# Patient Record
Sex: MALE | Employment: OTHER | URBAN - METROPOLITAN AREA
[De-identification: names, ages, dates, MRNs, and addresses within clinical notes are randomized per-mention and may not be internally consistent; named-entity substitution may affect disease eponyms.]

---

## 2018-02-02 PROBLEM — F32.A CHRONIC DEPRESSION: Status: ACTIVE | Noted: 2018-02-02

## 2018-02-02 PROBLEM — E11.9 TYPE 2 DIABETES MELLITUS WITHOUT COMPLICATION, WITHOUT LONG-TERM CURRENT USE OF INSULIN (HCC): Status: ACTIVE | Noted: 2018-02-02

## 2018-02-02 PROBLEM — I10 ESSENTIAL HYPERTENSION: Status: ACTIVE | Noted: 2018-02-02

## 2018-02-02 PROBLEM — N35.919 URETHRAL STRICTURE: Status: ACTIVE | Noted: 2018-02-02

## 2018-05-31 ENCOUNTER — HOSPITAL ENCOUNTER (OUTPATIENT)
Age: 84
Discharge: HOME | End: 2018-05-31
Payer: MEDICARE

## 2018-05-31 LAB
CHOLESTEROL, TOTAL: 108 MG/DL (ref 0–199)
HDLC SERPL-MCNC: 56 MG/DL
LDL CHOLESTEROL CALCULATED: 43 MG/DL (ref 0–99)
TRIGL SERPL-MCNC: 45 MG/DL (ref 0–149)
VLDLC SERPL CALC-MCNC: 9 MG/DL

## 2018-05-31 PROCEDURE — 36415 COLL VENOUS BLD VENIPUNCTURE: CPT

## 2018-05-31 PROCEDURE — 80061 LIPID PANEL: CPT

## 2018-06-01 PROBLEM — F33.42 RECURRENT MAJOR DEPRESSIVE DISORDER, IN FULL REMISSION (HCC): Status: ACTIVE | Noted: 2018-06-01

## 2018-11-30 PROBLEM — E11.21 TYPE 2 DIABETES MELLITUS WITH DIABETIC NEPHROPATHY, WITHOUT LONG-TERM CURRENT USE OF INSULIN (HCC): Status: ACTIVE | Noted: 2018-02-02

## 2018-11-30 PROBLEM — N18.30 STAGE 3 CHRONIC KIDNEY DISEASE (HCC): Status: ACTIVE | Noted: 2018-11-30

## 2019-07-17 PROBLEM — L20.84 INTRINSIC ECZEMA: Status: ACTIVE | Noted: 2019-07-17

## 2019-07-18 ENCOUNTER — HOSPITAL ENCOUNTER (OUTPATIENT)
Age: 85
Discharge: HOME | End: 2019-07-18
Payer: MEDICARE

## 2019-07-18 DIAGNOSIS — E11.9 TYPE 2 DIABETES MELLITUS WITHOUT COMPLICATION, WITHOUT LONG-TERM CURRENT USE OF INSULIN (HCC): ICD-10-CM

## 2019-07-18 LAB
ANION GAP SERPL CALCULATED.3IONS-SCNC: 14 MMOL/L (ref 7–16)
BUN BLDV-MCNC: 20 MG/DL (ref 8–23)
CALCIUM SERPL-MCNC: 9.2 MG/DL (ref 8.6–10.2)
CHLORIDE BLD-SCNC: 106 MMOL/L (ref 98–107)
CHOLESTEROL, TOTAL: 134 MG/DL (ref 0–199)
CO2: 21 MMOL/L (ref 22–29)
CREAT SERPL-MCNC: 1.5 MG/DL (ref 0.7–1.2)
CREATININE URINE: 98 MG/DL (ref 40–278)
GFR AFRICAN AMERICAN: 53
GFR NON-AFRICAN AMERICAN: 44 ML/MIN/1.73
GLUCOSE BLD-MCNC: 190 MG/DL (ref 74–99)
HBA1C MFR BLD: 6.8 % (ref 4–5.6)
HCT VFR BLD CALC: 33.3 % (ref 37–54)
HDLC SERPL-MCNC: 69 MG/DL
HEMOGLOBIN: 10.9 G/DL (ref 12.5–16.5)
LDL CHOLESTEROL CALCULATED: 53 MG/DL (ref 0–99)
MCH RBC QN AUTO: 31.4 PG (ref 26–35)
MCHC RBC AUTO-ENTMCNC: 32.7 % (ref 32–34.5)
MCV RBC AUTO: 96 FL (ref 80–99.9)
MICROALBUMIN UR-MCNC: <12 MG/L
MICROALBUMIN/CREAT UR-RTO: ABNORMAL (ref 0–30)
PDW BLD-RTO: 11.9 FL (ref 11.5–15)
PLATELET # BLD: 271 E9/L (ref 130–450)
PMV BLD AUTO: 9.1 FL (ref 7–12)
POTASSIUM SERPL-SCNC: 4.2 MMOL/L (ref 3.5–5)
RBC # BLD: 3.47 E12/L (ref 3.8–5.8)
SODIUM BLD-SCNC: 141 MMOL/L (ref 132–146)
TRIGL SERPL-MCNC: 61 MG/DL (ref 0–149)
VLDLC SERPL CALC-MCNC: 12 MG/DL
WBC # BLD: 9.4 E9/L (ref 4.5–11.5)

## 2019-07-18 PROCEDURE — 80061 LIPID PANEL: CPT

## 2019-07-18 PROCEDURE — 83036 HEMOGLOBIN GLYCOSYLATED A1C: CPT

## 2019-07-18 PROCEDURE — 82570 ASSAY OF URINE CREATININE: CPT

## 2019-07-18 PROCEDURE — 82044 UR ALBUMIN SEMIQUANTITATIVE: CPT

## 2019-07-18 PROCEDURE — 85027 COMPLETE CBC AUTOMATED: CPT

## 2019-07-18 PROCEDURE — 36415 COLL VENOUS BLD VENIPUNCTURE: CPT

## 2019-07-18 PROCEDURE — 80048 BASIC METABOLIC PNL TOTAL CA: CPT

## 2020-08-17 PROBLEM — F33.9 MAJOR DEPRESSION, RECURRENT, CHRONIC (HCC): Status: ACTIVE | Noted: 2020-08-17

## 2021-01-26 PROBLEM — E44.0 MODERATE PROTEIN-CALORIE MALNUTRITION (HCC): Status: ACTIVE | Noted: 2021-01-26

## 2021-05-25 ENCOUNTER — HOSPITAL ENCOUNTER (OUTPATIENT)
Age: 87
Discharge: HOME | End: 2021-05-25
Payer: MEDICARE

## 2021-05-25 DIAGNOSIS — E11.21 TYPE 2 DIABETES MELLITUS WITH DIABETIC NEPHROPATHY, WITHOUT LONG-TERM CURRENT USE OF INSULIN (HCC): ICD-10-CM

## 2021-05-25 LAB
ANION GAP SERPL CALCULATED.3IONS-SCNC: 9 MMOL/L (ref 7–16)
BUN BLDV-MCNC: 19 MG/DL (ref 6–23)
CALCIUM SERPL-MCNC: 8.4 MG/DL (ref 8.6–10.2)
CHLORIDE BLD-SCNC: 109 MMOL/L (ref 98–107)
CHOLESTEROL, TOTAL: 106 MG/DL (ref 0–199)
CO2: 21 MMOL/L (ref 22–29)
CREAT SERPL-MCNC: 1.7 MG/DL (ref 0.7–1.2)
CREATININE URINE: 143 MG/DL (ref 40–278)
GFR AFRICAN AMERICAN: 46
GFR NON-AFRICAN AMERICAN: 38 ML/MIN/1.73
GLUCOSE BLD-MCNC: 114 MG/DL (ref 74–99)
HBA1C MFR BLD: 5.5 % (ref 4–5.6)
HCT VFR BLD CALC: 33.9 % (ref 37–54)
HDLC SERPL-MCNC: 48 MG/DL
HEMOGLOBIN: 10.5 G/DL (ref 12.5–16.5)
LDL CHOLESTEROL CALCULATED: 47 MG/DL (ref 0–99)
MCH RBC QN AUTO: 30.5 PG (ref 26–35)
MCHC RBC AUTO-ENTMCNC: 31 % (ref 32–34.5)
MCV RBC AUTO: 98.5 FL (ref 80–99.9)
MICROALBUMIN UR-MCNC: 13.3 MG/L
MICROALBUMIN/CREAT UR-RTO: 9.3 (ref 0–30)
PDW BLD-RTO: 12.5 FL (ref 11.5–15)
PLATELET # BLD: 239 E9/L (ref 130–450)
PMV BLD AUTO: 9.5 FL (ref 7–12)
POTASSIUM SERPL-SCNC: 4.2 MMOL/L (ref 3.5–5)
RBC # BLD: 3.44 E12/L (ref 3.8–5.8)
SODIUM BLD-SCNC: 139 MMOL/L (ref 132–146)
TRIGL SERPL-MCNC: 54 MG/DL (ref 0–149)
VLDLC SERPL CALC-MCNC: 11 MG/DL
WBC # BLD: 5.1 E9/L (ref 4.5–11.5)

## 2021-05-25 PROCEDURE — 83036 HEMOGLOBIN GLYCOSYLATED A1C: CPT

## 2021-05-25 PROCEDURE — 85027 COMPLETE CBC AUTOMATED: CPT

## 2021-05-25 PROCEDURE — 80048 BASIC METABOLIC PNL TOTAL CA: CPT

## 2021-05-25 PROCEDURE — 82044 UR ALBUMIN SEMIQUANTITATIVE: CPT

## 2021-05-25 PROCEDURE — 36415 COLL VENOUS BLD VENIPUNCTURE: CPT

## 2021-05-25 PROCEDURE — 80061 LIPID PANEL: CPT

## 2021-05-25 PROCEDURE — 82570 ASSAY OF URINE CREATININE: CPT

## 2022-02-14 PROBLEM — F33.9 MAJOR DEPRESSION, RECURRENT, CHRONIC (HCC): Status: RESOLVED | Noted: 2020-08-17 | Resolved: 2022-02-14

## 2022-02-14 PROBLEM — F32.A DEPRESSION: Status: RESOLVED | Noted: 2018-02-02 | Resolved: 2022-02-14

## 2022-05-03 PROBLEM — E11.630: Status: ACTIVE | Noted: 2018-02-02

## 2023-01-31 ENCOUNTER — HOSPITAL ENCOUNTER (OUTPATIENT)
Age: 89
Discharge: HOME | End: 2023-01-31
Payer: COMMERCIAL

## 2023-01-31 DIAGNOSIS — E11.21 TYPE 2 DIABETES MELLITUS WITH DIABETIC NEPHROPATHY, WITHOUT LONG-TERM CURRENT USE OF INSULIN (HCC): ICD-10-CM

## 2023-01-31 LAB
ANION GAP SERPL CALCULATED.3IONS-SCNC: 15 MMOL/L (ref 7–16)
BUN BLDV-MCNC: 23 MG/DL (ref 6–23)
CALCIUM SERPL-MCNC: 9.2 MG/DL (ref 8.6–10.2)
CHLORIDE BLD-SCNC: 105 MMOL/L (ref 98–107)
CHOLESTEROL, TOTAL: 182 MG/DL (ref 0–199)
CO2: 23 MMOL/L (ref 22–29)
CREAT SERPL-MCNC: 1.6 MG/DL (ref 0.7–1.2)
CREATININE URINE: 71 MG/DL (ref 40–278)
GFR SERPL CREATININE-BSD FRML MDRD: 40 ML/MIN/1.73
GLUCOSE BLD-MCNC: 137 MG/DL (ref 74–99)
HBA1C MFR BLD: 5.5 % (ref 4–5.6)
HCT VFR BLD CALC: 37.5 % (ref 37–54)
HDLC SERPL-MCNC: 80 MG/DL
HEMOGLOBIN: 12.2 G/DL (ref 12.5–16.5)
LDL CHOLESTEROL CALCULATED: 91 MG/DL (ref 0–99)
MCH RBC QN AUTO: 31.6 PG (ref 26–35)
MCHC RBC AUTO-ENTMCNC: 32.5 % (ref 32–34.5)
MCV RBC AUTO: 97.2 FL (ref 80–99.9)
MICROALBUMIN UR-MCNC: 61.6 MG/L
MICROALBUMIN/CREAT UR-RTO: 86.8 (ref 0–30)
PDW BLD-RTO: 12.1 FL (ref 11.5–15)
PLATELET # BLD: 312 E9/L (ref 130–450)
PMV BLD AUTO: 9.9 FL (ref 7–12)
POTASSIUM SERPL-SCNC: 4.5 MMOL/L (ref 3.5–5)
RBC # BLD: 3.86 E12/L (ref 3.8–5.8)
SODIUM BLD-SCNC: 143 MMOL/L (ref 132–146)
TRIGL SERPL-MCNC: 55 MG/DL (ref 0–149)
VLDLC SERPL CALC-MCNC: 11 MG/DL
WBC # BLD: 6.2 E9/L (ref 4.5–11.5)

## 2023-01-31 PROCEDURE — 85027 COMPLETE CBC AUTOMATED: CPT

## 2023-01-31 PROCEDURE — 82570 ASSAY OF URINE CREATININE: CPT

## 2023-01-31 PROCEDURE — 82044 UR ALBUMIN SEMIQUANTITATIVE: CPT

## 2023-01-31 PROCEDURE — 80048 BASIC METABOLIC PNL TOTAL CA: CPT

## 2023-01-31 PROCEDURE — 80061 LIPID PANEL: CPT

## 2023-01-31 PROCEDURE — 36415 COLL VENOUS BLD VENIPUNCTURE: CPT

## 2023-01-31 PROCEDURE — 83036 HEMOGLOBIN GLYCOSYLATED A1C: CPT

## 2023-07-25 PROBLEM — E11.22 TYPE 2 DIABETES MELLITUS WITH CHRONIC KIDNEY DISEASE (HCC): Status: ACTIVE | Noted: 2023-07-25

## 2023-08-02 ENCOUNTER — HOSPITAL ENCOUNTER (OUTPATIENT)
Age: 89
Discharge: HOME | End: 2023-08-02
Payer: COMMERCIAL

## 2023-08-02 DIAGNOSIS — E11.9 DIABETES MELLITUS WITHOUT COMPLICATION (HCC): ICD-10-CM

## 2023-08-02 LAB
ANION GAP SERPL CALCULATED.3IONS-SCNC: 15 MMOL/L (ref 7–16)
BUN SERPL-MCNC: 13 MG/DL (ref 6–23)
CALCIUM SERPL-MCNC: 8.6 MG/DL (ref 8.6–10.2)
CHLORIDE SERPL-SCNC: 105 MMOL/L (ref 98–107)
CHOLEST SERPL-MCNC: 156 MG/DL
CO2 SERPL-SCNC: 23 MMOL/L (ref 22–29)
CREAT SERPL-MCNC: 1.7 MG/DL (ref 0.7–1.2)
ERYTHROCYTE [DISTWIDTH] IN BLOOD BY AUTOMATED COUNT: 11.8 % (ref 11.5–15)
GFR SERPL CREATININE-BSD FRML MDRD: 39 ML/MIN/1.73M2
GLUCOSE SERPL-MCNC: 105 MG/DL (ref 74–99)
HBA1C MFR BLD: 5.3 % (ref 4–5.6)
HCT VFR BLD AUTO: 34.7 % (ref 37–54)
HDLC SERPL-MCNC: 58 MG/DL
HGB BLD-MCNC: 11.3 G/DL (ref 12.5–16.5)
LDLC SERPL CALC-MCNC: 89 MG/DL
MCH RBC QN AUTO: 31.9 PG (ref 26–35)
MCHC RBC AUTO-ENTMCNC: 32.6 G/DL (ref 32–34.5)
MCV RBC AUTO: 98 FL (ref 80–99.9)
PLATELET # BLD AUTO: 236 K/UL (ref 130–450)
PMV BLD AUTO: 9.4 FL (ref 7–12)
POTASSIUM SERPL-SCNC: 4.7 MMOL/L (ref 3.5–5)
RBC # BLD AUTO: 3.54 M/UL (ref 3.8–5.8)
SODIUM SERPL-SCNC: 143 MMOL/L (ref 132–146)
TRIGL SERPL-MCNC: 47 MG/DL
VLDLC SERPL CALC-MCNC: 9 MG/DL
WBC OTHER # BLD: 5.3 K/UL (ref 4.5–11.5)

## 2023-08-02 PROCEDURE — 83036 HEMOGLOBIN GLYCOSYLATED A1C: CPT

## 2023-08-02 PROCEDURE — 36415 COLL VENOUS BLD VENIPUNCTURE: CPT

## 2023-08-02 PROCEDURE — 85027 COMPLETE CBC AUTOMATED: CPT

## 2023-08-02 PROCEDURE — 80048 BASIC METABOLIC PNL TOTAL CA: CPT

## 2023-08-02 PROCEDURE — 80061 LIPID PANEL: CPT

## 2024-02-28 PROBLEM — I10 ESSENTIAL HYPERTENSION: Status: RESOLVED | Noted: 2018-02-02 | Resolved: 2024-02-28

## 2024-02-28 PROBLEM — N18.30 STAGE 3 CHRONIC KIDNEY DISEASE (HCC): Status: RESOLVED | Noted: 2018-11-30 | Resolved: 2024-02-28

## 2024-02-28 PROBLEM — G30.1 ALZHEIMER'S DISEASE WITH LATE ONSET (HCC): Status: ACTIVE | Noted: 2024-02-28

## 2024-02-28 PROBLEM — E11.630: Status: RESOLVED | Noted: 2018-02-02 | Resolved: 2024-02-28

## 2024-02-28 PROBLEM — F02.80 ALZHEIMER'S DISEASE WITH LATE ONSET (HCC): Status: ACTIVE | Noted: 2024-02-28

## 2024-02-28 PROBLEM — E11.22 TYPE 2 DIABETES MELLITUS WITH CHRONIC KIDNEY DISEASE (HCC): Status: RESOLVED | Noted: 2023-07-25 | Resolved: 2024-02-28

## 2025-02-28 ENCOUNTER — APPOINTMENT (OUTPATIENT)
Dept: CT IMAGING | Age: 89
DRG: 690 | End: 2025-02-28
Payer: MEDICARE

## 2025-02-28 ENCOUNTER — HOSPITAL ENCOUNTER (INPATIENT)
Age: 89
LOS: 3 days | Discharge: HOME HEALTH | DRG: 690 | End: 2025-03-04
Attending: EMERGENCY MEDICINE | Admitting: INTERNAL MEDICINE
Payer: MEDICARE

## 2025-02-28 ENCOUNTER — APPOINTMENT (OUTPATIENT)
Dept: GENERAL RADIOLOGY | Age: 89
DRG: 690 | End: 2025-02-28
Payer: MEDICARE
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
## 2025-02-28 DIAGNOSIS — S09.90XA CLOSED HEAD INJURY, INITIAL ENCOUNTER: ICD-10-CM

## 2025-02-28 DIAGNOSIS — R31.9 URINARY TRACT INFECTION WITH HEMATURIA, SITE UNSPECIFIED: Primary | ICD-10-CM

## 2025-02-28 DIAGNOSIS — N39.0 URINARY TRACT INFECTION WITH HEMATURIA, SITE UNSPECIFIED: Primary | ICD-10-CM

## 2025-02-28 LAB
ALBUMIN SERPL-MCNC: 3.6 G/DL (ref 3.5–5.2)
ALP SERPL-CCNC: 148 U/L (ref 40–129)
ALT SERPL-CCNC: 18 U/L (ref 0–40)
ANION GAP SERPL CALCULATED.3IONS-SCNC: 14 MMOL/L (ref 7–16)
AST SERPL-CCNC: 36 U/L (ref 0–39)
BASOPHILS # BLD: 0.02 K/UL (ref 0–0.2)
BASOPHILS NFR BLD: 0 % (ref 0–2)
BILIRUB SERPL-MCNC: 0.3 MG/DL (ref 0–1.2)
BUN SERPL-MCNC: 30 MG/DL (ref 6–23)
CALCIUM SERPL-MCNC: 8.2 MG/DL (ref 8.6–10.2)
CHLORIDE SERPL-SCNC: 104 MMOL/L (ref 98–107)
CO2 SERPL-SCNC: 19 MMOL/L (ref 22–29)
CREAT SERPL-MCNC: 1.4 MG/DL (ref 0.7–1.2)
EOSINOPHIL # BLD: 0.01 K/UL (ref 0.05–0.5)
EOSINOPHILS RELATIVE PERCENT: 0 % (ref 0–6)
ERYTHROCYTE [DISTWIDTH] IN BLOOD BY AUTOMATED COUNT: 11.9 % (ref 11.5–15)
FLUAV RNA RESP QL NAA+PROBE: NOT DETECTED
FLUBV RNA RESP QL NAA+PROBE: NOT DETECTED
GFR, ESTIMATED: 45 ML/MIN/1.73M2
GLUCOSE SERPL-MCNC: 118 MG/DL (ref 74–99)
HCT VFR BLD AUTO: 32.4 % (ref 37–54)
HGB BLD-MCNC: 11 G/DL (ref 12.5–16.5)
IMM GRANULOCYTES # BLD AUTO: 0.03 K/UL (ref 0–0.58)
IMM GRANULOCYTES NFR BLD: 0 % (ref 0–5)
LYMPHOCYTES NFR BLD: 0.64 K/UL (ref 1.5–4)
LYMPHOCYTES RELATIVE PERCENT: 7 % (ref 20–42)
MCH RBC QN AUTO: 31.8 PG (ref 26–35)
MCHC RBC AUTO-ENTMCNC: 34 G/DL (ref 32–34.5)
MCV RBC AUTO: 93.6 FL (ref 80–99.9)
MONOCYTES NFR BLD: 0.5 K/UL (ref 0.1–0.95)
MONOCYTES NFR BLD: 5 % (ref 2–12)
NEUTROPHILS NFR BLD: 88 % (ref 43–80)
NEUTS SEG NFR BLD: 8.37 K/UL (ref 1.8–7.3)
PLATELET # BLD AUTO: 369 K/UL (ref 130–450)
PMV BLD AUTO: 8.8 FL (ref 7–12)
POTASSIUM SERPL-SCNC: 4.9 MMOL/L (ref 3.5–5)
PROT SERPL-MCNC: 7.3 G/DL (ref 6.4–8.3)
RBC # BLD AUTO: 3.46 M/UL (ref 3.8–5.8)
SARS-COV-2 RNA RESP QL NAA+PROBE: NOT DETECTED
SODIUM SERPL-SCNC: 137 MMOL/L (ref 132–146)
SOURCE: NORMAL
SPECIMEN DESCRIPTION: NORMAL
TROPONIN I SERPL HS-MCNC: 28 NG/L (ref 0–11)
WBC OTHER # BLD: 9.6 K/UL (ref 4.5–11.5)

## 2025-02-28 PROCEDURE — 93005 ELECTROCARDIOGRAM TRACING: CPT

## 2025-02-28 PROCEDURE — 84484 ASSAY OF TROPONIN QUANT: CPT

## 2025-02-28 PROCEDURE — 99285 EMERGENCY DEPT VISIT HI MDM: CPT

## 2025-02-28 PROCEDURE — 71045 X-RAY EXAM CHEST 1 VIEW: CPT

## 2025-02-28 PROCEDURE — 72125 CT NECK SPINE W/O DYE: CPT

## 2025-02-28 PROCEDURE — 85025 COMPLETE CBC W/AUTO DIFF WBC: CPT

## 2025-02-28 PROCEDURE — 70450 CT HEAD/BRAIN W/O DYE: CPT

## 2025-02-28 PROCEDURE — 80053 COMPREHEN METABOLIC PANEL: CPT

## 2025-02-28 PROCEDURE — 87636 SARSCOV2 & INF A&B AMP PRB: CPT

## 2025-02-28 ASSESSMENT — LIFESTYLE VARIABLES
HOW OFTEN DO YOU HAVE A DRINK CONTAINING ALCOHOL: NEVER
HOW MANY STANDARD DRINKS CONTAINING ALCOHOL DO YOU HAVE ON A TYPICAL DAY: PATIENT DOES NOT DRINK

## 2025-03-01 PROBLEM — N18.32 STAGE 3B CHRONIC KIDNEY DISEASE (HCC): Status: ACTIVE | Noted: 2018-11-30

## 2025-03-01 PROBLEM — N17.9 AKI (ACUTE KIDNEY INJURY): Status: ACTIVE | Noted: 2025-03-01

## 2025-03-01 PROBLEM — N39.0 UTI (URINARY TRACT INFECTION): Status: ACTIVE | Noted: 2025-03-01
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
## 2025-03-01 LAB
AMORPH SED URNS QL MICRO: PRESENT
ANION GAP SERPL CALCULATED.3IONS-SCNC: 16 MMOL/L (ref 7–16)
BACTERIA URNS QL MICRO: ABNORMAL
BASOPHILS # BLD: 0.02 K/UL (ref 0–0.2)
BASOPHILS NFR BLD: 0 % (ref 0–2)
BILIRUB UR QL STRIP: NEGATIVE
BUN SERPL-MCNC: 31 MG/DL (ref 6–23)
CALCIUM SERPL-MCNC: 8.5 MG/DL (ref 8.6–10.2)
CHLORIDE SERPL-SCNC: 106 MMOL/L (ref 98–107)
CLARITY UR: ABNORMAL
CO2 SERPL-SCNC: 18 MMOL/L (ref 22–29)
COLOR UR: YELLOW
CREAT SERPL-MCNC: 1.7 MG/DL (ref 0.7–1.2)
EOSINOPHIL # BLD: 0.02 K/UL (ref 0.05–0.5)
EOSINOPHILS RELATIVE PERCENT: 0 % (ref 0–6)
ERYTHROCYTE [DISTWIDTH] IN BLOOD BY AUTOMATED COUNT: 11.9 % (ref 11.5–15)
GFR, ESTIMATED: 38 ML/MIN/1.73M2
GLUCOSE BLD-MCNC: 129 MG/DL (ref 74–99)
GLUCOSE BLD-MCNC: 138 MG/DL (ref 74–99)
GLUCOSE SERPL-MCNC: 112 MG/DL (ref 74–99)
GLUCOSE UR STRIP-MCNC: NEGATIVE MG/DL
HBA1C MFR BLD: 5.2 % (ref 4–5.6)
HCT VFR BLD AUTO: 29.9 % (ref 37–54)
HGB BLD-MCNC: 10.3 G/DL (ref 12.5–16.5)
HGB UR QL STRIP.AUTO: ABNORMAL
IMM GRANULOCYTES # BLD AUTO: 0.03 K/UL (ref 0–0.58)
IMM GRANULOCYTES NFR BLD: 0 % (ref 0–5)
KETONES UR STRIP-MCNC: NEGATIVE MG/DL
LEUKOCYTE ESTERASE UR QL STRIP: ABNORMAL
LYMPHOCYTES NFR BLD: 1.12 K/UL (ref 1.5–4)
LYMPHOCYTES RELATIVE PERCENT: 13 % (ref 20–42)
MCH RBC QN AUTO: 31.8 PG (ref 26–35)
MCHC RBC AUTO-ENTMCNC: 34.4 G/DL (ref 32–34.5)
MCV RBC AUTO: 92.3 FL (ref 80–99.9)
MONOCYTES NFR BLD: 0.58 K/UL (ref 0.1–0.95)
MONOCYTES NFR BLD: 7 % (ref 2–12)
NEUTROPHILS NFR BLD: 80 % (ref 43–80)
NEUTS SEG NFR BLD: 6.84 K/UL (ref 1.8–7.3)
NITRITE UR QL STRIP: NEGATIVE
PH UR STRIP: 7.5 [PH] (ref 5–8)
PLATELET # BLD AUTO: 374 K/UL (ref 130–450)
PMV BLD AUTO: 8.7 FL (ref 7–12)
POTASSIUM SERPL-SCNC: 5 MMOL/L (ref 3.5–5)
PROT UR STRIP-MCNC: 30 MG/DL
RBC # BLD AUTO: 3.24 M/UL (ref 3.8–5.8)
RBC #/AREA URNS HPF: ABNORMAL /HPF
SODIUM SERPL-SCNC: 140 MMOL/L (ref 132–146)
SP GR UR STRIP: 1.01 (ref 1–1.03)
TROPONIN I SERPL HS-MCNC: 29 NG/L (ref 0–11)
TROPONIN I SERPL HS-MCNC: 34 NG/L (ref 0–11)
UROBILINOGEN UR STRIP-ACNC: 0.2 EU/DL (ref 0–1)
WBC #/AREA URNS HPF: ABNORMAL /HPF
WBC OTHER # BLD: 8.6 K/UL (ref 4.5–11.5)

## 2025-03-01 PROCEDURE — 82962 GLUCOSE BLOOD TEST: CPT

## 2025-03-01 PROCEDURE — 6370000000 HC RX 637 (ALT 250 FOR IP): Performed by: NURSE PRACTITIONER

## 2025-03-01 PROCEDURE — 83036 HEMOGLOBIN GLYCOSYLATED A1C: CPT

## 2025-03-01 PROCEDURE — 80048 BASIC METABOLIC PNL TOTAL CA: CPT

## 2025-03-01 PROCEDURE — 87040 BLOOD CULTURE FOR BACTERIA: CPT

## 2025-03-01 PROCEDURE — 87086 URINE CULTURE/COLONY COUNT: CPT

## 2025-03-01 PROCEDURE — 96374 THER/PROPH/DIAG INJ IV PUSH: CPT

## 2025-03-01 PROCEDURE — 6360000002 HC RX W HCPCS

## 2025-03-01 PROCEDURE — 6360000002 HC RX W HCPCS: Performed by: NURSE PRACTITIONER

## 2025-03-01 PROCEDURE — 84484 ASSAY OF TROPONIN QUANT: CPT

## 2025-03-01 PROCEDURE — 2060000000 HC ICU INTERMEDIATE R&B

## 2025-03-01 PROCEDURE — 81001 URINALYSIS AUTO W/SCOPE: CPT

## 2025-03-01 PROCEDURE — 2500000003 HC RX 250 WO HCPCS: Performed by: NURSE PRACTITIONER

## 2025-03-01 PROCEDURE — 85025 COMPLETE CBC W/AUTO DIFF WBC: CPT

## 2025-03-01 PROCEDURE — 2500000003 HC RX 250 WO HCPCS

## 2025-03-01 RX ORDER — POLYETHYLENE GLYCOL 3350 17 G/17G
17 POWDER, FOR SOLUTION ORAL DAILY PRN
Status: DISCONTINUED | OUTPATIENT
Start: 2025-03-01 | End: 2025-03-04 | Stop reason: HOSPADM

## 2025-03-01 RX ORDER — SODIUM CHLORIDE 9 MG/ML
INJECTION, SOLUTION INTRAVENOUS PRN
Status: DISCONTINUED | OUTPATIENT
Start: 2025-03-01 | End: 2025-03-04 | Stop reason: HOSPADM

## 2025-03-01 RX ORDER — SODIUM CHLORIDE 9 MG/ML
INJECTION, SOLUTION INTRAVENOUS CONTINUOUS
Status: DISCONTINUED | OUTPATIENT
Start: 2025-03-01 | End: 2025-03-01

## 2025-03-01 RX ORDER — ACETAMINOPHEN 325 MG/1
650 TABLET ORAL EVERY 6 HOURS PRN
Status: DISCONTINUED | OUTPATIENT
Start: 2025-03-01 | End: 2025-03-04 | Stop reason: HOSPADM

## 2025-03-01 RX ORDER — PROCHLORPERAZINE EDISYLATE 5 MG/ML
5 INJECTION INTRAMUSCULAR; INTRAVENOUS EVERY 6 HOURS PRN
Status: DISCONTINUED | OUTPATIENT
Start: 2025-03-01 | End: 2025-03-04 | Stop reason: HOSPADM

## 2025-03-01 RX ORDER — SODIUM CHLORIDE 0.9 % (FLUSH) 0.9 %
5-40 SYRINGE (ML) INJECTION PRN
Status: DISCONTINUED | OUTPATIENT
Start: 2025-03-01 | End: 2025-03-04 | Stop reason: HOSPADM

## 2025-03-01 RX ORDER — HEPARIN SODIUM 5000 [USP'U]/ML
5000 INJECTION, SOLUTION INTRAVENOUS; SUBCUTANEOUS EVERY 8 HOURS
Status: DISCONTINUED | OUTPATIENT
Start: 2025-03-01 | End: 2025-03-04 | Stop reason: HOSPADM

## 2025-03-01 RX ORDER — GLUCAGON 1 MG/ML
1 KIT INJECTION PRN
Status: DISCONTINUED | OUTPATIENT
Start: 2025-03-01 | End: 2025-03-04 | Stop reason: HOSPADM

## 2025-03-01 RX ORDER — DEXTROSE MONOHYDRATE 100 MG/ML
INJECTION, SOLUTION INTRAVENOUS CONTINUOUS PRN
Status: DISCONTINUED | OUTPATIENT
Start: 2025-03-01 | End: 2025-03-04 | Stop reason: HOSPADM

## 2025-03-01 RX ORDER — INSULIN LISPRO 100 [IU]/ML
0-4 INJECTION, SOLUTION INTRAVENOUS; SUBCUTANEOUS
Status: DISCONTINUED | OUTPATIENT
Start: 2025-03-01 | End: 2025-03-04 | Stop reason: HOSPADM

## 2025-03-01 RX ORDER — ACETAMINOPHEN 650 MG/1
650 SUPPOSITORY RECTAL EVERY 6 HOURS PRN
Status: DISCONTINUED | OUTPATIENT
Start: 2025-03-01 | End: 2025-03-04 | Stop reason: HOSPADM

## 2025-03-01 RX ORDER — SODIUM CHLORIDE 0.9 % (FLUSH) 0.9 %
5-40 SYRINGE (ML) INJECTION EVERY 12 HOURS SCHEDULED
Status: DISCONTINUED | OUTPATIENT
Start: 2025-03-01 | End: 2025-03-04 | Stop reason: HOSPADM

## 2025-03-01 RX ADMIN — HEPARIN SODIUM 5000 UNITS: 5000 INJECTION INTRAVENOUS; SUBCUTANEOUS at 13:54

## 2025-03-01 RX ADMIN — WATER 2000 MG: 1 INJECTION INTRAMUSCULAR; INTRAVENOUS; SUBCUTANEOUS at 03:48

## 2025-03-01 RX ADMIN — HEPARIN SODIUM 5000 UNITS: 5000 INJECTION INTRAVENOUS; SUBCUTANEOUS at 21:28

## 2025-03-01 RX ADMIN — ACETAMINOPHEN 650 MG: 325 TABLET ORAL at 23:05

## 2025-03-01 RX ADMIN — SODIUM CHLORIDE, PRESERVATIVE FREE 10 ML: 5 INJECTION INTRAVENOUS at 21:28

## 2025-03-01 NOTE — ED NOTES
Received report from SHELIA Aguilera; assumed care of pt at this time; resting in bed; awaiting lab results; needs met; will monitor

## 2025-03-01 NOTE — PROGRESS NOTES
Admission Database complete. Patient has been decontaminated due to bed bugs found this morning, bathed, all belongings, sprayed and double bagged. Patient instructed not to open belongings until he is at home.   Patient's son instructed he is unable to visit due to him living in same residents but a phone number was provided for him to call for an update.     Deanna Godinez RN, BSN

## 2025-03-01 NOTE — ED PROVIDER NOTES
[facility]  EMERGENCY DEPARTMENT ENCOUNTER        Pt Name: Paul Pemberton  MRN: [id number]  Birthdate 9/5/1929  Date of evaluation: 2/28/2025  Provider: Allen Worley MD  PCP: Guillaume Krishna DO  Note Started: 9:00 PM EST 2/28/25    CHIEF COMPLAINT       Chief Complaint   Patient presents with    Fall     Pt rolled and fell out of bed landing on his buttocks. Pt denies hitting head and denieaany injuries. Son stated pt has been getting more confused for the past 2-3months.        HISTORY OF PRESENT ILLNESS: 1 or more Elements   History From: Patient; EMS crew    Limitations to history : Altered Mental Status    Paul Pemberton is a 89 y.o. male with past medical history of Alzheimer's disease with late onset, malnutrition, diabetic neuropathy, hyperlipidemia, hypertension, osteoarthritis, type 2 diabetes mellitus who presents from home with complaints of mechanical fall.  Patient states he rolled out of bed today.  Patient is alert and oriented to self, place but is unsure of the year at this time.  Patient does know who the president is.  Patient denies any complaints at this time.  Patient is cooperative on exam.  Patient denies any chest pain, shortness of breath, abdominal pain, nausea, diarrhea, dysuria, hematuria, other falls or trauma, headache, blurry vision, numbness or tingling extremities, dizziness lightheadedness, fevers or chills.  EMS crew states patient's son states patient has been having worsening confusion for the past several months and denies any other falls or trauma at this time.  Patient denies being on the ground for a extended duration of time.  Patient states he was sent in by his son due to a fall the other day and denies any falls today at this time.    Nursing Notes were all reviewed and agreed with or any disagreements were addressed in the HPI.    ROS:   Pertinent positives and negatives are stated within HPI, all other systems reviewed and are  believe patient would benefit from admission.  Patient is agreeable to plan for admission.  Hospitalist consulted accepted patient for admission.  All questions answered.    Vital signs upon arrival BP (!) 119/57   Pulse 78   Temp 100 °F (37.8 °C) (Temporal)   Resp 12   SpO2 96%     See ED course below for more information.    DDX: Closed head injury, worsening Alzheimer's dementia, REGINALDO, UTI, viral illness, failure to thrive, ACS, electrolyte abnormality    Testing Considered, but not Done: MRI brain, renal ultrasound    Is this patient to be included in the SEP-1 core measure? No Exclusion criteria - the patient is NOT to be included for SEP-1 Core Measure due to: 2+ SIRS criteria are not met    Non-plain film images such as CT, Ultrasound and MRI are read by the radiologist. Plain radiographic images are visualized and preliminarily interpreted by the ED Provider with the below findings:    Chest x-ray showing no acute cardiopulmonary abnormality.    Discussion with Other Professionals: See ED course  CONSULTS: discussion with bolded \"IP consult\", otherwise consult was likely placed by admitting service  IP CONSULT TO HOSPITALIST    Social determinants of health affecting patient care:  stress, not elsewhere classified and poor medical literacy    Records Reviewed : Source PCP note from 2/20/2024    CONSULTS: Hospitalist for admission    DISPOSITION:     Consultation with hospitalist who accepted the patient for admission.  The patient will be admitted for further treatment and evaluation for   1. Urinary tract infection with hematuria, site unspecified    2. Closed head injury, initial encounter    3. Fall from bed, initial encounter      Re-Evaluations/Consultations:             ED Course as of 03/02/25 0130   Fri Feb 28, 2025 2109 Patient presents from home with complaints of mechanical fall.  Patient states he rolled out of bed today.  Patient is alert and oriented to self, place but is unsure of the

## 2025-03-01 NOTE — H&P
Hospital Medicine History & Physical      PCP: Guillaume Krishna DO    Date of Admission: 2/28/2025    Date of Service: . MARCH 1, 2025    Chief Complaint:   FALL      History Of Present Illness:     89 y.o. male presented with FALL.  HE HAS A HISTORY OF DEMENTIA. NO FAMILY PRESENT,  APPARENTLY HE FELL OUT OF BED.  AND HE IS MORE CONFUSED.      Past Medical History:          Diagnosis Date    Allergic rhinitis     Diabetic neuropathy (HCC)     Hyperlipidemia     Hypertension     Osteoarthritis     Type 2 diabetes mellitus without complication (HCC)        Past Surgical History:          Procedure Laterality Date    URETHRAL STRICTURE DILATATION         Medications Prior to Admission:      Prior to Admission medications    Medication Sig Start Date End Date Taking? Authorizing Provider   bismuth subsalicylate (PEPTO BISMOL) 262 MG/15ML suspension Take 30 mLs by mouth every 6 hours as needed for Indigestion or Other    Provider, Historical, MD       Allergies:  Patient has no known allergies.    Social History:        TOBACCO:   reports that he has never smoked. He has never used smokeless tobacco.  ETOH:   reports no history of alcohol use.      Family History:       Reviewed in detail and negative for DM, CAD, Cancer, CVA. Positive as follows:    History reviewed. No pertinent family history.    REVIEW OF SYSTEMS:   Pertinent positives as noted in the HPI. All other systems reviewed and negative.    PHYSICAL EXAM:    BP (!) 129/46   Pulse 79   Temp 98 °F (36.7 °C) (Oral)   Resp 16   SpO2 98%     General appearance:  No apparent distress, appears stated age.  HEENT:  Normal cephalic, atraumatic without obvious deformity. Pupils equal, round, and reactive to light.  Extra ocular muscles intact. Conjunctivae/corneas clear.  Neck: Supple, with full range of motion. No jugular venous distention.  late onset (HCC) [G30.1, F02.80] 02/28/2024    Stage 3b chronic kidney disease (HCC) [N18.32] 11/30/2018    Type II diabetes mellitus (HCC) [E11.9] 02/02/2018         PLAN:  ROCEPHIN   SSI    DVT Prophylaxis:  HEPARIN  Diet: ADULT DIET; Regular; 5 carb choices (75 gm/meal); Low Fat/Low Chol/High Fiber/2 gm Na  Code Status: Full Code    PT/OT Eval Status: ORDERED    Dispo - GABRIELA    Electronically signed by Tejas Schroeder DO on 3/1/2025 at 1:27 PM FACOI       Thank you Guillaume Krishna DO for the opportunity to be involved in this patient's care. If you have any questions or concerns please feel free to contact me at [phone]

## 2025-03-02 LAB
ANION GAP SERPL CALCULATED.3IONS-SCNC: 10 MMOL/L (ref 7–16)
BASOPHILS # BLD: 0.03 K/UL (ref 0–0.2)
BASOPHILS NFR BLD: 1 % (ref 0–2)
BUN SERPL-MCNC: 27 MG/DL (ref 6–23)
CALCIUM SERPL-MCNC: 7.8 MG/DL (ref 8.6–10.2)
CHLORIDE SERPL-SCNC: 110 MMOL/L (ref 98–107)
CO2 SERPL-SCNC: 22 MMOL/L (ref 22–29)
CREAT SERPL-MCNC: 1.8 MG/DL (ref 0.7–1.2)
EOSINOPHIL # BLD: 0.14 K/UL (ref 0.05–0.5)
EOSINOPHILS RELATIVE PERCENT: 2 % (ref 0–6)
ERYTHROCYTE [DISTWIDTH] IN BLOOD BY AUTOMATED COUNT: 12.1 % (ref 11.5–15)
GFR, ESTIMATED: 35 ML/MIN/1.73M2
GLUCOSE BLD-MCNC: 103 MG/DL (ref 74–99)
GLUCOSE BLD-MCNC: 105 MG/DL (ref 74–99)
GLUCOSE BLD-MCNC: 98 MG/DL (ref 74–99)
GLUCOSE SERPL-MCNC: 91 MG/DL (ref 74–99)
HCT VFR BLD AUTO: 29.1 % (ref 37–54)
HGB BLD-MCNC: 9.7 G/DL (ref 12.5–16.5)
IMM GRANULOCYTES # BLD AUTO: <0.03 K/UL (ref 0–0.58)
IMM GRANULOCYTES NFR BLD: 0 % (ref 0–5)
LYMPHOCYTES NFR BLD: 1.4 K/UL (ref 1.5–4)
LYMPHOCYTES RELATIVE PERCENT: 24 % (ref 20–42)
MCH RBC QN AUTO: 31.1 PG (ref 26–35)
MCHC RBC AUTO-ENTMCNC: 33.3 G/DL (ref 32–34.5)
MCV RBC AUTO: 93.3 FL (ref 80–99.9)
MICROORGANISM SPEC CULT: ABNORMAL
MONOCYTES NFR BLD: 0.58 K/UL (ref 0.1–0.95)
MONOCYTES NFR BLD: 10 % (ref 2–12)
NEUTROPHILS NFR BLD: 62 % (ref 43–80)
NEUTS SEG NFR BLD: 3.59 K/UL (ref 1.8–7.3)
PLATELET # BLD AUTO: 337 K/UL (ref 130–450)
PMV BLD AUTO: 8.9 FL (ref 7–12)
POTASSIUM SERPL-SCNC: 4.6 MMOL/L (ref 3.5–5)
PSA SERPL-MCNC: 3.15 NG/ML (ref 0–4)
RBC # BLD AUTO: 3.12 M/UL (ref 3.8–5.8)
SERVICE CMNT-IMP: ABNORMAL
SODIUM SERPL-SCNC: 142 MMOL/L (ref 132–146)
SPECIMEN DESCRIPTION: ABNORMAL
WBC OTHER # BLD: 5.8 K/UL (ref 4.5–11.5)

## 2025-03-02 PROCEDURE — 2060000000 HC ICU INTERMEDIATE R&B

## 2025-03-02 PROCEDURE — 2500000003 HC RX 250 WO HCPCS: Performed by: NURSE PRACTITIONER

## 2025-03-02 PROCEDURE — 82962 GLUCOSE BLOOD TEST: CPT

## 2025-03-02 PROCEDURE — 36415 COLL VENOUS BLD VENIPUNCTURE: CPT

## 2025-03-02 PROCEDURE — 80048 BASIC METABOLIC PNL TOTAL CA: CPT

## 2025-03-02 PROCEDURE — 85025 COMPLETE CBC W/AUTO DIFF WBC: CPT

## 2025-03-02 PROCEDURE — 84153 ASSAY OF PSA TOTAL: CPT

## 2025-03-02 PROCEDURE — 6360000002 HC RX W HCPCS: Performed by: NURSE PRACTITIONER

## 2025-03-02 RX ADMIN — HEPARIN SODIUM 5000 UNITS: 5000 INJECTION INTRAVENOUS; SUBCUTANEOUS at 06:06

## 2025-03-02 RX ADMIN — WATER 1000 MG: 1 INJECTION INTRAMUSCULAR; INTRAVENOUS; SUBCUTANEOUS at 01:53

## 2025-03-02 RX ADMIN — SODIUM CHLORIDE, PRESERVATIVE FREE 10 ML: 5 INJECTION INTRAVENOUS at 09:00

## 2025-03-02 RX ADMIN — HEPARIN SODIUM 5000 UNITS: 5000 INJECTION INTRAVENOUS; SUBCUTANEOUS at 13:59

## 2025-03-02 NOTE — PROGRESS NOTES
4 Eyes Skin Assessment     NAME:  Paul Pemberton  YOB: 1929  MEDICAL RECORD NUMBER:  [id number]    The patient is being assessed for  Admission    I agree that at least one RN has performed a thorough Head to Toe Skin Assessment on the patient. ALL assessment sites listed below have been assessed.      Areas assessed by both nurses:    Head, Face, Ears, Shoulders, Back, Chest, Arms, Elbows, Hands, Sacrum. Buttock, Coccyx, Ischium, Legs. Feet and Heels, and Under Medical Devices         Does the Patient have a Wound? Yes wound(s) were present on assessment. LDA wound assessment was Initiated and completed by RN       Abdirahman Prevention initiated by RN: Yes  Wound Care Orders initiated by RN: Yes    Pressure Injury (Stage 3,4, Unstageable, DTI, NWPT, and Complex wounds) if present, place Wound referral order by RN under : Yes    New Ostomies, if present place, Ostomy referral order under : No     Nurse 1 eSignature: Electronically signed by Eileen Zimmerman RN on 3/1/25 at 11:38 PM EST    **SHARE this note so that the co-signing nurse can place an eSignature**    Nurse 2 eSignature: Electronically signed by Gladis Green RN on 3/2/25 at 7:01 AM EST

## 2025-03-02 NOTE — PLAN OF CARE
Problem: Skin/Tissue Integrity  Goal: Skin integrity remains intact  Description: 1.  Monitor for areas of redness and/or skin breakdown  2.  Assess vascular access sites hourly  3.  Every 4-6 hours minimum:  Change oxygen saturation probe site  4.  Every 4-6 hours:  If on nasal continuous positive airway pressure, respiratory therapy assess nares and determine need for appliance change or resting period  Outcome: Progressing  Flowsheets (Taken 3/2/2025 0800)  Skin Integrity Remains Intact: Monitor for areas of redness and/or skin breakdown     Problem: Chronic Conditions and Co-morbidities  Goal: Patient's chronic conditions and co-morbidity symptoms are monitored and maintained or improved  Outcome: Progressing     Problem: Discharge Planning  Goal: Discharge to home or other facility with appropriate resources  Outcome: Progressing     Problem: ABCDS Injury Assessment  Goal: Absence of physical injury  Outcome: Progressing     Problem: Safety - Adult  Goal: Free from fall injury  Outcome: Progressing

## 2025-03-02 NOTE — PROGRESS NOTES
[facility]                                Progress note    Subjective:    Resting comfortably in bed  No acute events overnight    Objective:    BP (!) 105/58   Pulse 87   Temp 98.7 °F (37.1 °C) (Temporal)   Resp 16   SpO2 97%     In: -   Out: 200   In: -   Out: 200 [Urine:200]    General appearance: NAD  HEENT: AT/NC, MMM  Neck: FROM, supple  Lungs: Clear to auscultation  CV: RRR, no MRGs  Vasc: Radial pulses 2+  Abdomen: Soft, non-tender; no masses or HSM  Extremities: No peripheral edema or digital cyanosis  Skin: no rash, lesions or ulcers  Psych: Alert and oriented to person  Neuro: Alert      Recent Labs     02/28/25  2115 03/01/25  0600 03/02/25  0606   WBC 9.6 8.6 5.8   HGB 11.0* 10.3* 9.7*   HCT 32.4* 29.9* 29.1*    374 337       Recent Labs     02/28/25  2246 03/01/25  0651 03/02/25  0606    140 142   K 4.9 5.0 4.6    106 110*   CO2 19* 18* 22   BUN 30* 31* 27*   CREATININE 1.4* 1.7* 1.8*   CALCIUM 8.2* 8.5* 7.8*       Assessment:    Principal Problem:    UTI (urinary tract infection)  Active Problems:    Type II diabetes mellitus (HCC)    Stage 3b chronic kidney disease (HCC)    Alzheimer's disease with late onset (HCC)    Fall    REGINALDO (acute kidney injury)  Resolved Problems:    * No resolved hospital problems. *      Plan:    Patient is a 89-year-old male admitted to [facility] for  UTI  -Monitor labs  -Continue IV Rocephin pending urine cultures  -Mildly worsening kidney function, 27/1.8  -Bladder scan ordered  -Start IVF NS at 50  -Await PT OT evaluations to determine discharge needs    Code status: Full  Consultants: None    DVT Prophylaxis heparin  PT/OT  Discharge planning         JOSE Austin - CNP,  1:47 PM  3/2/2025

## 2025-03-02 NOTE — PLAN OF CARE
Problem: Skin/Tissue Integrity  Goal: Skin integrity remains intact  Description: 1.  Monitor for areas of redness and/or skin breakdown  2.  Assess vascular access sites hourly  3.  Every 4-6 hours minimum:  Change oxygen saturation probe site  4.  Every 4-6 hours:  If on nasal continuous positive airway pressure, respiratory therapy assess nares and determine need for appliance change or resting period  Outcome: Progressing  Flowsheets  Taken 3/1/2025 2311 by Jaswinder Zimmerman RN  Skin Integrity Remains Intact: Monitor for areas of redness and/or skin breakdown  Taken 3/1/2025 2305 by Jaswinder Zimmerman RN  Skin Integrity Remains Intact: Monitor for areas of redness and/or skin breakdown  Taken 3/1/2025 1841 by Deanna Godinez RN  Skin Integrity Remains Intact: Monitor for areas of redness and/or skin breakdown     Problem: Chronic Conditions and Co-morbidities  Goal: Patient's chronic conditions and co-morbidity symptoms are monitored and maintained or improved  Outcome: Progressing  Flowsheets (Taken 3/1/2025 2305)  Care Plan - Patient's Chronic Conditions and Co-Morbidity Symptoms are Monitored and Maintained or Improved: Monitor and assess patient's chronic conditions and comorbid symptoms for stability, deterioration, or improvement     Problem: Discharge Planning  Goal: Discharge to home or other facility with appropriate resources  Outcome: Progressing  Flowsheets (Taken 3/1/2025 2305)  Discharge to home or other facility with appropriate resources: Identify barriers to discharge with patient and caregiver     Problem: ABCDS Injury Assessment  Goal: Absence of physical injury  Outcome: Progressing     Problem: Safety - Adult  Goal: Free from fall injury  Outcome: Progressing

## 2025-03-03 LAB
ANION GAP SERPL CALCULATED.3IONS-SCNC: 12 MMOL/L (ref 7–16)
BASOPHILS # BLD: 0.03 K/UL (ref 0–0.2)
BASOPHILS NFR BLD: 0 % (ref 0–2)
BUN SERPL-MCNC: 26 MG/DL (ref 6–23)
CALCIUM SERPL-MCNC: 8.1 MG/DL (ref 8.6–10.2)
CHLORIDE SERPL-SCNC: 109 MMOL/L (ref 98–107)
CO2 SERPL-SCNC: 23 MMOL/L (ref 22–29)
CREAT SERPL-MCNC: 1.7 MG/DL (ref 0.7–1.2)
EKG ATRIAL RATE: 72 BPM
EKG P AXIS: 101 DEGREES
EKG P-R INTERVAL: 208 MS
EKG Q-T INTERVAL: 500 MS
EKG QRS DURATION: 126 MS
EKG QTC CALCULATION (BAZETT): 547 MS
EKG R AXIS: -66 DEGREES
EKG T AXIS: -48 DEGREES
EKG VENTRICULAR RATE: 72 BPM
EOSINOPHIL # BLD: 0.11 K/UL (ref 0.05–0.5)
EOSINOPHILS RELATIVE PERCENT: 2 % (ref 0–6)
ERYTHROCYTE [DISTWIDTH] IN BLOOD BY AUTOMATED COUNT: 12.4 % (ref 11.5–15)
GFR, ESTIMATED: 38 ML/MIN/1.73M2
GLUCOSE BLD-MCNC: 166 MG/DL (ref 74–99)
GLUCOSE BLD-MCNC: 182 MG/DL (ref 74–99)
GLUCOSE BLD-MCNC: 93 MG/DL (ref 74–99)
GLUCOSE BLD-MCNC: 99 MG/DL (ref 74–99)
GLUCOSE SERPL-MCNC: 163 MG/DL (ref 74–99)
HCT VFR BLD AUTO: 33 % (ref 37–54)
HGB BLD-MCNC: 10.6 G/DL (ref 12.5–16.5)
IMM GRANULOCYTES # BLD AUTO: <0.03 K/UL (ref 0–0.58)
IMM GRANULOCYTES NFR BLD: 0 % (ref 0–5)
LYMPHOCYTES NFR BLD: 1.75 K/UL (ref 1.5–4)
LYMPHOCYTES RELATIVE PERCENT: 26 % (ref 20–42)
MCH RBC QN AUTO: 31 PG (ref 26–35)
MCHC RBC AUTO-ENTMCNC: 32.1 G/DL (ref 32–34.5)
MCV RBC AUTO: 96.5 FL (ref 80–99.9)
MONOCYTES NFR BLD: 0.66 K/UL (ref 0.1–0.95)
MONOCYTES NFR BLD: 10 % (ref 2–12)
NEUTROPHILS NFR BLD: 63 % (ref 43–80)
NEUTS SEG NFR BLD: 4.27 K/UL (ref 1.8–7.3)
PLATELET # BLD AUTO: 378 K/UL (ref 130–450)
PMV BLD AUTO: 8.8 FL (ref 7–12)
POTASSIUM SERPL-SCNC: 4.7 MMOL/L (ref 3.5–5)
RBC # BLD AUTO: 3.42 M/UL (ref 3.8–5.8)
SODIUM SERPL-SCNC: 144 MMOL/L (ref 132–146)
WBC OTHER # BLD: 6.8 K/UL (ref 4.5–11.5)

## 2025-03-03 PROCEDURE — 6360000002 HC RX W HCPCS: Performed by: NURSE PRACTITIONER

## 2025-03-03 PROCEDURE — 6370000000 HC RX 637 (ALT 250 FOR IP): Performed by: INTERNAL MEDICINE

## 2025-03-03 PROCEDURE — 82962 GLUCOSE BLOOD TEST: CPT

## 2025-03-03 PROCEDURE — 2500000003 HC RX 250 WO HCPCS: Performed by: NURSE PRACTITIONER

## 2025-03-03 PROCEDURE — 97535 SELF CARE MNGMENT TRAINING: CPT

## 2025-03-03 PROCEDURE — 93010 ELECTROCARDIOGRAM REPORT: CPT | Performed by: INTERNAL MEDICINE

## 2025-03-03 PROCEDURE — 97530 THERAPEUTIC ACTIVITIES: CPT

## 2025-03-03 PROCEDURE — 1200000000 HC SEMI PRIVATE

## 2025-03-03 PROCEDURE — 80048 BASIC METABOLIC PNL TOTAL CA: CPT

## 2025-03-03 PROCEDURE — 36415 COLL VENOUS BLD VENIPUNCTURE: CPT

## 2025-03-03 PROCEDURE — 85025 COMPLETE CBC W/AUTO DIFF WBC: CPT

## 2025-03-03 PROCEDURE — 97162 PT EVAL MOD COMPLEX 30 MIN: CPT

## 2025-03-03 PROCEDURE — 97165 OT EVAL LOW COMPLEX 30 MIN: CPT

## 2025-03-03 RX ADMIN — HEPARIN SODIUM 5000 UNITS: 5000 INJECTION INTRAVENOUS; SUBCUTANEOUS at 20:31

## 2025-03-03 RX ADMIN — SODIUM CHLORIDE, PRESERVATIVE FREE 10 ML: 5 INJECTION INTRAVENOUS at 08:19

## 2025-03-03 RX ADMIN — INSULIN LISPRO 1 UNITS: 100 INJECTION, SOLUTION INTRAVENOUS; SUBCUTANEOUS at 08:18

## 2025-03-03 RX ADMIN — SODIUM CHLORIDE, PRESERVATIVE FREE 10 ML: 5 INJECTION INTRAVENOUS at 20:32

## 2025-03-03 RX ADMIN — WATER 1000 MG: 1 INJECTION INTRAMUSCULAR; INTRAVENOUS; SUBCUTANEOUS at 02:30

## 2025-03-03 RX ADMIN — HEPARIN SODIUM 5000 UNITS: 5000 INJECTION INTRAVENOUS; SUBCUTANEOUS at 13:03

## 2025-03-03 NOTE — PROGRESS NOTES
N2N called to Tarah BASS at [phone]. All patient information given      Electronically signed by Sangeetha Hill RN on 3/3/2025 at 8:04 AM

## 2025-03-03 NOTE — PROGRESS NOTES
4 Eyes Skin Assessment     NAME:  Paul Pemberton  YOB: 1929  MEDICAL RECORD NUMBER:  [id number]    The patient is being assessed for  Admission    I agree that at least one RN has performed a thorough Head to Toe Skin Assessment on the patient. ALL assessment sites listed below have been assessed.      Areas assessed by both nurses:    Head, Face, Ears, Shoulders, Back, Chest, Arms, Elbows, Hands, Sacrum. Buttock, Coccyx, Ischium, and Legs. Feet and Heels        Does the Patient have a Wound? Yes wound(s) were present on assessment. LDA wound assessment was Initiated and completed by RN Right hip wound dry. Paul said it was from when he feel out of be at home.       Abdirahman Prevention initiated by RN: Yes  Wound Care Orders initiated by RN: Yes    Pressure Injury (Stage 3,4, Unstageable, DTI, NWPT, and Complex wounds) if present, place Wound referral order by RN under : No    New Ostomies, if present place, Ostomy referral order under : No     Nurse 1 eSignature: Electronically signed by Vandana Jones RN on 3/3/25 at 9:56 AM EST    **SHARE this note so that the co-signing nurse can place an eSignature**    Nurse 2 eSignature: Electronically signed by Maria Eugenia Flores RN on 3/3/25 at 9:57 AM EST

## 2025-03-03 NOTE — PROGRESS NOTES
Physical Therapy    Physical Therapy Initial Assessment     Name: Paul Pemberton  : 1929  MRN: [id number]      Date of Service: 3/3/2025    Evaluating PT:  Jeremiah Lake, PT, DPT  [id number]     Room #:  5404/5404-B  Diagnosis:  UTI (urinary tract infection) [N39.0]  Fall from bed, initial encounter [W06.XXXA]  Closed head injury, initial encounter [S09.90XA]  Urinary tract infection with hematuria, site unspecified [N39.0, R31.9]  PMHx/PSHx:   has a past medical history of Allergic rhinitis, Diabetic neuropathy (HCC), Hyperlipidemia, Hypertension, Osteoarthritis, and Type 2 diabetes mellitus without complication (HCC).   Procedure/Surgery:  None  Precautions:  Falls, Alarms   Equipment Needs:  FWW    SUBJECTIVE:    Pt lives with his son in a 2 story home with a couple stairs to enter.  Bedroom and bathroom are on the 2nd level with full flight and rail.  Pt ambulated with no AD PTA.    OBJECTIVE:   Initial Evaluation  Date: 3/3/25 Treatment Short Term/ Long Term   Goals   AM-PAC 6 Clicks 15/24     Was pt agreeable to Eval/treatment? Yes      Does pt have pain? No c/o pain     Bed Mobility  Rolling: NT  Supine to sit: SBA  Sit to supine: NT  Scooting: SBA   Rolling: Supervision   Supine to sit: Supervision   Sit to supine: Supervision   Scooting: Supervision    Transfers Sit to stand: Min A  Stand to sit: Min A  Stand pivot: Mod A with no AD;  Min A with FWW   Sit to stand: Supervision   Stand to sit: Supervision   Stand pivot: Supervision with FWW   Ambulation    10 feet with no AD Mod A;  30 feet x2 with FWW Min A  >100 feet with FWW SBA    Stair negotiation: ascended and descended  NT  >4 steps with 1 rail SBA    ROM BUE:  Per OT eval  BLE:  WFL     Strength BUE:  Per OT eval   BLE:  WFL     Balance Sitting EOB:  Supervision   Dynamic Standing:  Mod A with no AD;  Min A with FWW   Sitting EOB:  Supervision  Dynamic Standing:  SBA with FWW     Pt is A & O x 2-3  Sensation:  Pt denies numbness and tingling to  extremities  Edema:  Unremarkable    Therapeutic Exercises:    BLE AROM at EOB  STS x 2 reps from bed; x1 rep from toilet     Patient education  Pt educated on PT role, safety during functional mobility    Patient response to education:   Pt verbalized understanding Pt demonstrated skill Pt requires further education in this area   Yes  Yes  Yes      ASSESSMENT:    Conditions Requiring Skilled Therapeutic Intervention:    [x]Decreased strength     []Decreased ROM  [x]Decreased functional mobility  [x]Decreased balance   [x]Decreased endurance   []Decreased posture  []Decreased sensation  []Decreased coordination   []Decreased vision  [x]Decreased safety awareness   []Increased pain       Comments:  Pt received supine and agreeable to PT evaluation with OT collab.  Vitals monitored during session.  Pt is pleasant and agreeable.  Pt demonstrates generalized weakness.  During ambulation without device pt was quite unsteady and required assistance plus reaching for environment.  Pt was given FWW and balance did improve.  Pt required assistance to bathroom d/t bowel incontinence.  Pt assisted on/off toilet and assisted with standing at sink for ADL.  Pt assisted back to EOB.  Left under nursing care.  Pt would benefit from continued PT services at discharge.     Treatment:  Patient practiced and was instructed in the following treatment:    Bed mobility training - pt given verbal and tactile cues to facilitate proper sequencing and safety during supine>sit as well as provided with physical assistance to complete task    STS and pivot transfer training - pt educated on proper hand and foot placement, safety and sequencing, and use of no AD vs FWW to safely complete sit<>stand and pivot transfers with hands on assistance to complete task safely    Gait training- pt was given verbal and tactile cues to facilitate safety/balance, FWW usage during ambulation as well as provided with physical assistance.     Pt's/ family goals

## 2025-03-03 NOTE — PROGRESS NOTES
[facility]                                Progress note    Subjective:    Resting in bed  No acute complaints    Objective:    BP (!) 127/42   Pulse 69   Temp 97.9 °F (36.6 °C) (Temporal)   Resp 17   SpO2 96%     In: 450 [P.O.:450]  Out: -   In: 450   Out: -     General appearance: NAD  HEENT: AT/NC, MMM  Neck: FROM, supple  Lungs: Clear to auscultation  CV: RRR, no MRGs  Vasc: Radial pulses 2+  Abdomen: Soft, non-tender; no masses or HSM  Extremities: No peripheral edema or digital cyanosis  Skin: no rash, lesions or ulcers  Psych: Alert and oriented to person  Neuro: Alert      Recent Labs     03/01/25  0600 03/02/25  0606 03/03/25  0547   WBC 8.6 5.8 6.8   HGB 10.3* 9.7* 10.6*   HCT 29.9* 29.1* 33.0*    337 378       Recent Labs     03/01/25  0651 03/02/25  0606 03/03/25  0547    142 144   K 5.0 4.6 4.7    110* 109*   CO2 18* 22 23   BUN 31* 27* 26*   CREATININE 1.7* 1.8* 1.7*   CALCIUM 8.5* 7.8* 8.1*       Assessment:    Principal Problem:    UTI (urinary tract infection)  Active Problems:    Type II diabetes mellitus (HCC)    Stage 3b chronic kidney disease (HCC)    Alzheimer's disease with late onset (HCC)    Fall    REGINALDO (acute kidney injury)  Resolved Problems:    * No resolved hospital problems. *      Plan:    Patient is a 89-year-old male admitted to [facility] for  UTI  -Monitor labs  -Continue IV Rocephin pending urine cultures  -Mildly worsening kidney function, 27/1.8  -Bladder scan ordered  -Start IVF NS at 50  -Await PT OT evaluations to determine discharge needs    3/3/25  -PT OT recommending rehab on discharge  -Continue IV Rocephin, to complete treatment  -Awaiting acceptance to facility  -Improving H&H, 10.6/23.0  -Monitor renal kidney function, 26/1.7  -Continue to monitor I's and O's  -DC pending discharge to facility    Code status: Full  Consultants: None    DVT Prophylaxis heparin  PT/OT  Discharge planning         JOSE Austin -

## 2025-03-03 NOTE — PROGRESS NOTES
OCCUPATIONAL THERAPY INITIAL EVALUATION    [facility]  [address], [place]          Date:3/3/2025                                                   Patient Name: Paul Pemberton     MRN: [id number]     : 1929     Room: [address] [place] [address]       Evaluating OT: Murile Milner OTD, OTR/L, [id number]      Referring Provider: Smita Araujo APRN - CNP     Specific Provider Orders/Date: OT eval and treat (3/3/25)    Diagnosis: UTI (urinary tract infection) [N39.0]  Fall from bed, initial encounter [W06.XXXA]  Closed head injury, initial encounter [S09.90XA]  Urinary tract infection with hematuria, site unspecified [N39.0, R31.9]    Surgeries/Procedures: None this admission       Pt admitted with UTI, fall out of bed.      Pertinent Medical History:      has a past medical history of Allergic rhinitis, Diabetic neuropathy (HCC), Hyperlipidemia, Hypertension, Osteoarthritis, and Type 2 diabetes mellitus without complication (HCC).         Precautions:  Fall Risk, alarms+     Assessment of current deficits    [x] Functional mobility  [x]ADLs  [x] Strength               [x]Cognition    [x] Functional transfers   [x] IADLs         [x] Safety Awareness   [x]Endurance    [] Fine Coordination              [x] Balance      [] Vision/perception   []Sensation     []Gross Motor Coordination  [] ROM  [] Delirium                   [] Motor Control     OT PLAN OF CARE   OT POC based on physician orders, patient diagnosis and results of clinical assessment    Frequency/Duration 1-3 days/wk for 2 weeks PRN   Specific OT Treatment Interventions to include:   * Instruction/training on adapted ADL techniques and AE recommendations to increase functional independence within precautions       * Training on energy conservation strategies, correct breathing pattern and techniques to improve independence/tolerance for self-care routine  * Functional transfer/mobility training/DME  Eval Medium 38720      OT Eval High 56221      OT Re-Eval 14329       Therapeutic Ex 72437       Therapeutic Activities 92697 8  1    ADL/Self Care 90393 15  1    Orthotic Management 76091       Manual 35957     Neuro Re-Ed 42197       Non-Billable Time          Evaluation Time additionally includes thorough review of current medical information, gathering information on past medical history/social history and prior level of function, interpretation of standardized testing/informal observation of tasks, assessment of data and development of plan of care and goals.           Muriel Milner, OTD,  OTR/L; [id number]

## 2025-03-03 NOTE — PLAN OF CARE
Problem: Skin/Tissue Integrity  Goal: Skin integrity remains intact  Description: 1.  Monitor for areas of redness and/or skin breakdown  2.  Assess vascular access sites hourly  3.  Every 4-6 hours minimum:  Change oxygen saturation probe site  4.  Every 4-6 hours:  If on nasal continuous positive airway pressure, respiratory therapy assess nares and determine need for appliance change or resting period  3/3/2025 0826 by Sangeetha Hill RN  Outcome: Progressing     Problem: Chronic Conditions and Co-morbidities  Goal: Patient's chronic conditions and co-morbidity symptoms are monitored and maintained or improved  3/3/2025 0826 by Sangeetha Hill RN  Outcome: Progressing     Problem: Discharge Planning  Goal: Discharge to home or other facility with appropriate resources  3/3/2025 0826 by Sangeetha Hill RN  Outcome: Progressing     Problem: ABCDS Injury Assessment  Goal: Absence of physical injury  3/3/2025 0826 by Sangeetha Hill RN  Outcome: Progressing     Problem: Safety - Adult  Goal: Free from fall injury  3/3/2025 0826 by Sangeetha Hill, RN  Outcome: Progressing

## 2025-03-03 NOTE — PLAN OF CARE
Problem: Skin/Tissue Integrity  Goal: Skin integrity remains intact  Description: 1.  Monitor for areas of redness and/or skin breakdown  2.  Assess vascular access sites hourly  3.  Every 4-6 hours minimum:  Change oxygen saturation probe site  4.  Every 4-6 hours:  If on nasal continuous positive airway pressure, respiratory therapy assess nares and determine need for appliance change or resting period  3/3/2025 0623 by Roxana Olmedo RN  Outcome: Progressing  3/2/2025 1724 by Chato Westbrook RN  Outcome: Progressing  Flowsheets (Taken 3/2/2025 0800)  Skin Integrity Remains Intact: Monitor for areas of redness and/or skin breakdown     Problem: Chronic Conditions and Co-morbidities  Goal: Patient's chronic conditions and co-morbidity symptoms are monitored and maintained or improved  3/3/2025 0623 by Roxana Olmedo RN  Outcome: Progressing  3/2/2025 1724 by Chato Westbrook RN  Outcome: Progressing     Problem: Discharge Planning  Goal: Discharge to home or other facility with appropriate resources  3/3/2025 0623 by Roxana Olmedo RN  Outcome: Progressing  3/2/2025 1724 by Chato Westbrook RN  Outcome: Progressing     Problem: ABCDS Injury Assessment  Goal: Absence of physical injury  3/3/2025 0623 by Roxana Olmedo RN  Outcome: Progressing  3/2/2025 1724 by Chato Westbrook RN  Outcome: Progressing

## 2025-03-03 NOTE — CARE COORDINATION
3/3/25 CM Note Pt admitted 3/1/25 for UTI,  IV Rocephin.  Pt from home with son.Dr Trimble is PCP, [facility] is preferred pharmacy Uses walker for ambulation.  Per son, recent decline in ability to perform ADLs.  PT /OT evals are pending.  Anticipating DC to SNF.  list emailed to son Paul for review. Son expecting call later today or tomorrow from  to obtain choices   Tina HORTONN RN-BC  [phone]

## 2025-03-04 VITALS
SYSTOLIC BLOOD PRESSURE: 117 MMHG | DIASTOLIC BLOOD PRESSURE: 76 MMHG | BODY MASS INDEX: 21.16 KG/M2 | OXYGEN SATURATION: 98 % | TEMPERATURE: 97.2 F | HEART RATE: 83 BPM | HEIGHT: 62 IN | WEIGHT: 115 LBS | RESPIRATION RATE: 16 BRPM

## 2025-03-04 LAB
ANION GAP SERPL CALCULATED.3IONS-SCNC: 12 MMOL/L (ref 7–16)
BUN SERPL-MCNC: 21 MG/DL (ref 6–23)
CALCIUM SERPL-MCNC: 7.8 MG/DL (ref 8.6–10.2)
CHLORIDE SERPL-SCNC: 109 MMOL/L (ref 98–107)
CO2 SERPL-SCNC: 19 MMOL/L (ref 22–29)
CREAT SERPL-MCNC: 1.5 MG/DL (ref 0.7–1.2)
GFR, ESTIMATED: 42 ML/MIN/1.73M2
GLUCOSE BLD-MCNC: 110 MG/DL (ref 74–99)
GLUCOSE BLD-MCNC: 131 MG/DL (ref 74–99)
GLUCOSE SERPL-MCNC: 94 MG/DL (ref 74–99)
POTASSIUM SERPL-SCNC: 4.5 MMOL/L (ref 3.5–5)
SODIUM SERPL-SCNC: 140 MMOL/L (ref 132–146)

## 2025-03-04 PROCEDURE — 80048 BASIC METABOLIC PNL TOTAL CA: CPT

## 2025-03-04 PROCEDURE — 36415 COLL VENOUS BLD VENIPUNCTURE: CPT

## 2025-03-04 PROCEDURE — 82962 GLUCOSE BLOOD TEST: CPT

## 2025-03-04 PROCEDURE — 2500000003 HC RX 250 WO HCPCS: Performed by: NURSE PRACTITIONER

## 2025-03-04 PROCEDURE — 97530 THERAPEUTIC ACTIVITIES: CPT

## 2025-03-04 PROCEDURE — 6360000002 HC RX W HCPCS: Performed by: NURSE PRACTITIONER

## 2025-03-04 PROCEDURE — 97535 SELF CARE MNGMENT TRAINING: CPT

## 2025-03-04 RX ORDER — CEFDINIR 300 MG/1
300 CAPSULE ORAL DAILY
Qty: 6 CAPSULE | Refills: 0 | Status: SHIPPED | OUTPATIENT
Start: 2025-03-04 | End: 2025-03-10

## 2025-03-04 RX ADMIN — SODIUM CHLORIDE, PRESERVATIVE FREE 10 ML: 5 INJECTION INTRAVENOUS at 08:48

## 2025-03-04 RX ADMIN — HEPARIN SODIUM 5000 UNITS: 5000 INJECTION INTRAVENOUS; SUBCUTANEOUS at 08:50

## 2025-03-04 RX ADMIN — WATER 1000 MG: 1 INJECTION INTRAMUSCULAR; INTRAVENOUS; SUBCUTANEOUS at 06:06

## 2025-03-04 NOTE — DISCHARGE SUMMARY
[facility]   Discharge summary   Patient ID:  Paul Pemberton  [id number]  89 y.o.  9/5/1929    Admit date: 2/28/2025    Discharge date and time: 3/4/2025    Admission Diagnoses:   Patient Active Problem List   Diagnosis    Type II diabetes mellitus (HCC)    Urethral stricture    Stage 3b chronic kidney disease (HCC)    Intrinsic eczema    Moderate protein malnutrition    Alzheimer's disease with late onset (HCC)    UTI (urinary tract infection)    Fall    REGINALDO (acute kidney injury)       Discharge Diagnoses: UTI    Consults: none    Procedures: none    Hospital Course: The patient is a 89 y.o. male of Guillaume Krishna DO     Patient is a 89-year-old male admitted to [facility] for  UTI  -Monitor labs  -Continue IV Rocephin pending urine cultures  -Mildly worsening kidney function, 27/1.8  -Bladder scan ordered  -Start IVF NS at 50  -Await PT OT evaluations to determine discharge needs     3/3/25  -PT OT recommending rehab on discharge  -Continue IV Rocephin, to complete treatment  -Awaiting acceptance to facility  -Improving H&H, 10.6/23.0  -Monitor renal kidney function, 26/1.7  -Continue to monitor I's and O's  -DC pending discharge to facility    3/4/25  -IV Rocephin transition to p.o. Omnicef to complete treatment  -Patient has declined going to a nursing facility for rehab and family is okay with this plan  -Home with home health care  -Follow-up with PCP within 1 week of discharge    Recent Labs     03/02/25  0606 03/03/25  0547   WBC 5.8 6.8   HGB 9.7* 10.6*   HCT 29.1* 33.0*    378       Recent Labs     03/02/25  0606 03/03/25  0547 03/04/25  0344    144 140   K 4.6 4.7 4.5   * 109* 109*   CO2 22 23 19*   BUN 27* 26* 21   CREATININE 1.8* 1.7* 1.5*   CALCIUM 7.8* 8.1* 7.8*       XR CHEST PORTABLE    Result Date: 2/28/2025  EXAMINATION: ONE XRAY VIEW OF THE CHEST 2/28/2025 9:35 pm COMPARISON: None. HISTORY: ORDERING SYSTEM PROVIDED HISTORY: fall TECHNOLOGIST PROVIDED HISTORY:  fracture or traumatic malalignment.       Discharge Exam:    HEENT: NCAT,  PERRLA, No JVD  Heart:  RRR, no murmurs, gallops, or rubs.  Lungs:  CTA bilaterally, no wheeze, rales or rhonchi  Abd: bowel sounds present, nontender, nondistended, no masses  Extrem:  No clubbing, cyanosis, or edema    Disposition: home     Patient Condition at Discharge: Stable     Patient Instructions:      Medication List        START taking these medications      cefdinir 300 MG capsule  Commonly known as: OMNICEF  Take 1 capsule by mouth daily for 6 days            CONTINUE taking these medications      bismuth subsalicylate 262 MG/15ML suspension  Commonly known as: PEPTO BISMOL               Where to Get Your Medications        These medications were sent to [id number] - [place], [place] - [address] - P [phone] - F [phone] [address] [address]      Phone: [phone]   cefdinir 300 MG capsule       Activity: activity as tolerated  Diet: regular diet    Pt has been advised to:    Follow-up with Guillaume Krishna DO in 1 week.  Follow-up with consultants as recommended by them      Signed:  JOSE Austin CNP  3/4/2025  1:50 PM    Above note edited to reflect my thoughts     I personally provided care for the patient. Radiographs, labs and medication list were reviewed by me independently.  The case was discussed in detail and plans for care were established. Review of DINO Austin   , documentation was conducted and revisions were made as appropriate directly by me. I agree with the above documented exam, problem list, and plan of care.     Ana Taylor MD

## 2025-03-04 NOTE — PROGRESS NOTES
Occupational Therapy  OT BEDSIDE TREATMENT NOTE   [facility]  [address], [place]       Date:3/4/2025  Patient Name: Paul Pemberton  MRN: [id number]  : 1929  [facility]     Per OT Eval:   Evaluating OT: Muriel Milner OTD, OTR/L, [id number]       Referring Provider: Smita Araujo APRN - CNP     Specific Provider Orders/Date: OT eval and treat (3/3/25)    Diagnosis: UTI (urinary tract infection) [N39.0]  Fall from bed, initial encounter [W06.XXXA]  Closed head injury, initial encounter [S09.90XA]  Urinary tract infection with hematuria, site unspecified [N39.0, R31.9]    Surgeries/Procedures: None this admission        Pt admitted with UTI, fall out of bed.       Pertinent Medical History:      has a past medical history of Allergic rhinitis, Diabetic neuropathy (HCC), Hyperlipidemia, Hypertension, Osteoarthritis, and Type 2 diabetes mellitus without complication (HCC).           Precautions:  Fall Risk, alarms+      Assessment of current deficits    [x] Functional mobility            [x]ADLs           [x] Strength                   [x]Cognition    [x] Functional transfers          [x] IADLs          [x] Safety Awareness   [x]Endurance    [] Fine Coordination              [x] Balance      [] Vision/perception    []Sensation      []Gross Motor Coordination  [] ROM           [] Delirium                   [] Motor Control      OT PLAN OF CARE   OT POC based on physician orders, patient diagnosis and results of clinical assessment     Frequency/Duration 1-3 days/wk for 2 weeks PRN   Specific OT Treatment Interventions to include:   * Instruction/training on adapted ADL techniques and AE recommendations to increase functional independence within precautions       * Training on energy conservation strategies, correct breathing pattern and techniques to improve independence/tolerance for self-care routine  * Functional transfer/mobility

## 2025-03-04 NOTE — PROGRESS NOTES
AVS reviewed with patient and son. Gave pt sweats because of his clothes not able to be opened. The son said he'd help his dad get dressed. Transport placed.

## 2025-03-04 NOTE — PROGRESS NOTES
Transport came and said that the pt needs help getting dressed and the son isnt in the room. Told transport that this nurse needs time to get back into the room and to hold him going downstairs

## 2025-03-04 NOTE — CARE COORDINATION
Care Coordination  Left a detailed voice message for the patients alexa Pemberton Jr @ [phone] this morning to get skilled choices. Await a call back. The patient was admitted after a fall out of his bed and has a history of dementia. He has been declining.  Pt [facility] 15/24 and he walked 10 feet with no ad mod assist, 30 feet x 2 with a fww, ot [facility] 15/24.      1344- Spoke to the patient at bedside and he is planning on going home and is declining skilled care. Got a hold of the patients son Paul Pemberton and said its fine if he come home. Offered home care and he is in agreement. Referral made to [facility] await if accepted. Home care orders are in. [facility] can accept the patient.

## 2025-03-05 ENCOUNTER — CARE COORDINATION (OUTPATIENT)
Dept: CARE COORDINATION | Age: 89
End: 2025-03-05

## 2025-03-05 NOTE — CARE COORDINATION
Care Transitions Note    Initial Call - Call within 2 business days of discharge: Yes    Attempted to reach patient, family, Son Paul  for transitions of care follow up. Unable to reach patient, family, son Paul . Contacted [facility] and informed SOC will be tomorrow. [facility] was unaware patient was discharged yesterday.    Outreach Attempts:   HIPAA compliant voicemail left for family, Dwight Miller.     Patient: Paul Pemberton    Patient : 1929   MRN: [id number]    Reason for Admission: fall, UTI  Discharge Date: 3/4/25  RURS: Readmission Risk Score: 12.7    Last Discharge Facility       Date Complaint Diagnosis Description Type Department Provider    25 Fall Urinary tract infection with hematuria, site unspecified ... ED to Hosp-Admission (Discharged) (ADMITTED) [facility] Ana Taylor MD; Efren Ogden...            Was this an external facility discharge? No    Follow Up Appointment:   Patient does not have a follow up appointment scheduled at time of call.  Unable to reach      Plan for follow-up on next business day.      Gardenia Santos RN

## 2025-03-06 ENCOUNTER — CARE COORDINATION (OUTPATIENT)
Dept: CARE COORDINATION | Age: 89
End: 2025-03-06

## 2025-03-06 LAB
MICROORGANISM SPEC CULT: NORMAL
MICROORGANISM SPEC CULT: NORMAL
SERVICE CMNT-IMP: NORMAL
SERVICE CMNT-IMP: NORMAL
SPECIMEN DESCRIPTION: NORMAL
SPECIMEN DESCRIPTION: NORMAL

## 2025-03-06 NOTE — CARE COORDINATION
Care Transitions Note    Initial Call - Call within 2 business days of discharge: Yes    Attempted to reach patient for transitions of care follow up. Unable to reach patient. No answer. CTN sign off for transitions    Outreach Attempts:   Multiple attempts to contact patient, family, son Paul at phone numbers on file.     Patient: Paul Pemberton    Patient : 1929   MRN: [id number]    Reason for Admission: UTI, fall  Discharge Date: 3/4/25  RURS: Readmission Risk Score: 12.7    Last Discharge Facility       Date Complaint Diagnosis Description Type Department Provider    25 Fall Urinary tract infection with hematuria, site unspecified ... ED to Hosp-Admission (Discharged) (ADMITTED) [facility] Ana Taylor MD; Efren Ogden...            Was this an external facility discharge? No    Follow Up Appointment:   Patient does not have a follow up appointment scheduled at time of call.  Unable to reach      No further follow-up call indicated     Gardenia Santos RN

## 2025-03-11 NOTE — PROGRESS NOTES
Physician Progress Note      PATIENT:               RADHAMES PULIDO  CSN #:                  [id number]  :                       1929  ADMIT DATE:       2025 8:59 PM  DISCH DATE:        3/4/2025 5:38 PM  RESPONDING  PROVIDER #:        Ana Taylor MD          QUERY TEXT:    Patient admitted after a fall with findings for a UTI. Documentation reflects   REGINALDO in pnotes on 3/2 and 3/3 under active problem list.  If possible, please   document in the progress notes and discharge summary if REGINALDO was:    The medical record reflects the following:  Risk Factors: UTI, DM, CKD  Clinical Indicators: () crea 1.4, (3/1) crea 1.7, (3/2) crea 1.8  Treatment: NS @ 75 ml/hr, serial crea levels    Thank you,  Edu RN, CCDS  [email or web address]  Options provided:  -- REGINALDO confirmed after study  -- REGINALDO ruled out after study  -- Other - I will add my own diagnosis  -- Disagree - Not applicable / Not valid  -- Disagree - Clinically unable to determine / Unknown  -- Refer to Clinical Documentation Reviewer    PROVIDER RESPONSE TEXT:    REGINALDO confirmed after study.    Query created by: Edu Isaacs on 3/6/2025 8:45 AM      Electronically signed by:  Ana Taylor MD 3/11/2025 6:14 AM

## 2025-03-13 ENCOUNTER — APPOINTMENT (OUTPATIENT)
Dept: GENERAL RADIOLOGY | Age: 89
DRG: 884 | End: 2025-03-13
Payer: MEDICARE

## 2025-03-13 ENCOUNTER — APPOINTMENT (OUTPATIENT)
Dept: CT IMAGING | Age: 89
DRG: 884 | End: 2025-03-13
Payer: MEDICARE

## 2025-03-13 ENCOUNTER — HOSPITAL ENCOUNTER (INPATIENT)
Age: 89
LOS: 4 days | Discharge: NURSING, REHAB OR LONG-TERM CARE | DRG: 884 | End: 2025-03-17
Attending: EMERGENCY MEDICINE | Admitting: STUDENT IN AN ORGANIZED HEALTH CARE EDUCATION/TRAINING PROGRAM
Payer: MEDICARE

## 2025-03-13 DIAGNOSIS — R41.82 ALTERED MENTAL STATUS, UNSPECIFIED ALTERED MENTAL STATUS TYPE: ICD-10-CM

## 2025-03-13 DIAGNOSIS — R62.7 FAILURE TO THRIVE IN ADULT: Primary | ICD-10-CM

## 2025-03-13 PROBLEM — Z78.9 UNABLE TO CARE FOR SELF: Status: ACTIVE | Noted: 2025-03-13

## 2025-03-13 PROBLEM — R29.6 FALLS: Status: ACTIVE | Noted: 2025-03-13

## 2025-03-13 LAB
ALBUMIN SERPL-MCNC: 3.4 G/DL (ref 3.5–5.2)
ALP SERPL-CCNC: 163 U/L (ref 40–129)
ALT SERPL-CCNC: 23 U/L (ref 0–40)
ANION GAP SERPL CALCULATED.3IONS-SCNC: 15 MMOL/L (ref 7–16)
AST SERPL-CCNC: 32 U/L (ref 0–39)
BACTERIA URNS QL MICRO: ABNORMAL
BASOPHILS # BLD: 0.03 K/UL (ref 0–0.2)
BASOPHILS NFR BLD: 0 % (ref 0–2)
BILIRUB SERPL-MCNC: 0.3 MG/DL (ref 0–1.2)
BILIRUB UR QL STRIP: NEGATIVE
BUN SERPL-MCNC: 19 MG/DL (ref 6–23)
CALCIUM SERPL-MCNC: 8.2 MG/DL (ref 8.6–10.2)
CHLORIDE SERPL-SCNC: 104 MMOL/L (ref 98–107)
CLARITY UR: CLEAR
CO2 SERPL-SCNC: 17 MMOL/L (ref 22–29)
COLOR UR: YELLOW
CREAT SERPL-MCNC: 1.3 MG/DL (ref 0.7–1.2)
EOSINOPHIL # BLD: 0.14 K/UL (ref 0.05–0.5)
EOSINOPHILS RELATIVE PERCENT: 1 % (ref 0–6)
ERYTHROCYTE [DISTWIDTH] IN BLOOD BY AUTOMATED COUNT: 12.4 % (ref 11.5–15)
GFR, ESTIMATED: 53 ML/MIN/1.73M2
GLUCOSE SERPL-MCNC: 128 MG/DL (ref 74–99)
GLUCOSE UR STRIP-MCNC: NEGATIVE MG/DL
HCT VFR BLD AUTO: 30.8 % (ref 37–54)
HGB BLD-MCNC: 10.4 G/DL (ref 12.5–16.5)
HGB UR QL STRIP.AUTO: ABNORMAL
IMM GRANULOCYTES # BLD AUTO: 0.05 K/UL (ref 0–0.58)
IMM GRANULOCYTES NFR BLD: 1 % (ref 0–5)
KETONES UR STRIP-MCNC: NEGATIVE MG/DL
LEUKOCYTE ESTERASE UR QL STRIP: ABNORMAL
LYMPHOCYTES NFR BLD: 1.47 K/UL (ref 1.5–4)
LYMPHOCYTES RELATIVE PERCENT: 14 % (ref 20–42)
MCH RBC QN AUTO: 31 PG (ref 26–35)
MCHC RBC AUTO-ENTMCNC: 33.8 G/DL (ref 32–34.5)
MCV RBC AUTO: 91.9 FL (ref 80–99.9)
MONOCYTES NFR BLD: 0.67 K/UL (ref 0.1–0.95)
MONOCYTES NFR BLD: 6 % (ref 2–12)
NEUTROPHILS NFR BLD: 78 % (ref 43–80)
NEUTS SEG NFR BLD: 8.22 K/UL (ref 1.8–7.3)
NITRITE UR QL STRIP: NEGATIVE
PH UR STRIP: 6 [PH] (ref 5–8)
PLATELET # BLD AUTO: 352 K/UL (ref 130–450)
PMV BLD AUTO: 8.9 FL (ref 7–12)
POTASSIUM SERPL-SCNC: 4.8 MMOL/L (ref 3.5–5)
PROCALCITONIN SERPL-MCNC: 0.1 NG/ML (ref 0–0.08)
PROT SERPL-MCNC: 7.6 G/DL (ref 6.4–8.3)
PROT UR STRIP-MCNC: ABNORMAL MG/DL
RBC # BLD AUTO: 3.35 M/UL (ref 3.8–5.8)
RBC #/AREA URNS HPF: ABNORMAL /HPF
SODIUM SERPL-SCNC: 136 MMOL/L (ref 132–146)
SP GR UR STRIP: 1.01 (ref 1–1.03)
UROBILINOGEN UR STRIP-ACNC: 0.2 EU/DL (ref 0–1)
WBC #/AREA URNS HPF: ABNORMAL /HPF
WBC OTHER # BLD: 10.6 K/UL (ref 4.5–11.5)

## 2025-03-13 PROCEDURE — 80053 COMPREHEN METABOLIC PANEL: CPT

## 2025-03-13 PROCEDURE — 72170 X-RAY EXAM OF PELVIS: CPT

## 2025-03-13 PROCEDURE — 85025 COMPLETE CBC W/AUTO DIFF WBC: CPT

## 2025-03-13 PROCEDURE — 6360000002 HC RX W HCPCS: Performed by: NURSE PRACTITIONER

## 2025-03-13 PROCEDURE — 71045 X-RAY EXAM CHEST 1 VIEW: CPT

## 2025-03-13 PROCEDURE — 72125 CT NECK SPINE W/O DYE: CPT

## 2025-03-13 PROCEDURE — 99285 EMERGENCY DEPT VISIT HI MDM: CPT

## 2025-03-13 PROCEDURE — 70450 CT HEAD/BRAIN W/O DYE: CPT

## 2025-03-13 PROCEDURE — 84145 PROCALCITONIN (PCT): CPT

## 2025-03-13 PROCEDURE — 93005 ELECTROCARDIOGRAM TRACING: CPT | Performed by: EMERGENCY MEDICINE

## 2025-03-13 PROCEDURE — 1200000000 HC SEMI PRIVATE

## 2025-03-13 PROCEDURE — 81001 URINALYSIS AUTO W/SCOPE: CPT

## 2025-03-13 RX ORDER — POTASSIUM CHLORIDE 1500 MG/1
40 TABLET, EXTENDED RELEASE ORAL PRN
Status: CANCELLED | OUTPATIENT
Start: 2025-03-13 | End: 2025-03-16

## 2025-03-13 RX ORDER — SODIUM CHLORIDE 0.9 % (FLUSH) 0.9 %
5-40 SYRINGE (ML) INJECTION EVERY 12 HOURS SCHEDULED
Status: DISCONTINUED | OUTPATIENT
Start: 2025-03-13 | End: 2025-03-18 | Stop reason: HOSPADM

## 2025-03-13 RX ORDER — POTASSIUM CHLORIDE 7.45 MG/ML
10 INJECTION INTRAVENOUS PRN
Status: CANCELLED | OUTPATIENT
Start: 2025-03-13 | End: 2025-03-16

## 2025-03-13 RX ORDER — PROCHLORPERAZINE EDISYLATE 5 MG/ML
5 INJECTION INTRAMUSCULAR; INTRAVENOUS EVERY 6 HOURS PRN
Status: DISCONTINUED | OUTPATIENT
Start: 2025-03-13 | End: 2025-03-18 | Stop reason: HOSPADM

## 2025-03-13 RX ORDER — MAGNESIUM SULFATE IN WATER 40 MG/ML
2000 INJECTION, SOLUTION INTRAVENOUS PRN
Status: CANCELLED | OUTPATIENT
Start: 2025-03-13

## 2025-03-13 RX ORDER — HEPARIN SODIUM 5000 [USP'U]/ML
5000 INJECTION, SOLUTION INTRAVENOUS; SUBCUTANEOUS EVERY 8 HOURS
Status: DISCONTINUED | OUTPATIENT
Start: 2025-03-13 | End: 2025-03-18 | Stop reason: HOSPADM

## 2025-03-13 RX ORDER — POLYETHYLENE GLYCOL 3350 17 G/17G
17 POWDER, FOR SOLUTION ORAL DAILY PRN
Status: DISCONTINUED | OUTPATIENT
Start: 2025-03-13 | End: 2025-03-18 | Stop reason: HOSPADM

## 2025-03-13 RX ORDER — SODIUM CHLORIDE 9 MG/ML
INJECTION, SOLUTION INTRAVENOUS PRN
Status: DISCONTINUED | OUTPATIENT
Start: 2025-03-13 | End: 2025-03-18 | Stop reason: HOSPADM

## 2025-03-13 RX ORDER — GLUCAGON 1 MG/ML
1 KIT INJECTION PRN
Status: DISCONTINUED | OUTPATIENT
Start: 2025-03-13 | End: 2025-03-18 | Stop reason: HOSPADM

## 2025-03-13 RX ORDER — SODIUM CHLORIDE 0.9 % (FLUSH) 0.9 %
5-40 SYRINGE (ML) INJECTION PRN
Status: DISCONTINUED | OUTPATIENT
Start: 2025-03-13 | End: 2025-03-18 | Stop reason: HOSPADM

## 2025-03-13 RX ORDER — DEXTROSE MONOHYDRATE 100 MG/ML
INJECTION, SOLUTION INTRAVENOUS CONTINUOUS PRN
Status: DISCONTINUED | OUTPATIENT
Start: 2025-03-13 | End: 2025-03-18 | Stop reason: HOSPADM

## 2025-03-13 RX ADMIN — HEPARIN SODIUM 5000 UNITS: 5000 INJECTION INTRAVENOUS; SUBCUTANEOUS at 20:37

## 2025-03-13 NOTE — ED PROVIDER NOTES
General: No focal deficit present.      Mental Status: He is alert. Mental status is at baseline.      Cranial Nerves: No cranial nerve deficit.      Sensory: No sensory deficit.      Motor: No weakness.          DIAGNOSTIC RESULTS   LABS:    Labs Reviewed   CBC WITH AUTO DIFFERENTIAL - Abnormal; Notable for the following components:       Result Value    RBC 3.35 (*)     Hemoglobin 10.4 (*)     Hematocrit 30.8 (*)     Lymphocytes % 14 (*)     Neutrophils Absolute 8.22 (*)     Lymphocytes Absolute 1.47 (*)     All other components within normal limits   COMPREHENSIVE METABOLIC PANEL - Abnormal; Notable for the following components:    CO2 17 (*)     Glucose 128 (*)     Creatinine 1.3 (*)     Est, Glom Filt Rate 53 (*)     Calcium 8.2 (*)     Albumin 3.4 (*)     Alkaline Phosphatase 163 (*)     All other components within normal limits   URINALYSIS WITH MICROSCOPIC - Abnormal; Notable for the following components:    Urine Hgb TRACE (*)     Protein, UA TRACE (*)     Leukocyte Esterase, Urine TRACE (*)     WBC, UA 6 TO 9 (*)     Bacteria, UA TRACE (*)     All other components within normal limits   PROCALCITONIN - Abnormal; Notable for the following components:    Procalcitonin 0.10 (*)     All other components within normal limits   BASIC METABOLIC PANEL W/ REFLEX TO MG FOR LOW K   CBC WITH AUTO DIFFERENTIAL   POCT GLUCOSE   POCT GLUCOSE       As interpreted by me, the above displayed labs are abnormal. All other labs obtained during this visit were within normal range or not returned as of this dictation.      EKG Interpretation  Interpreted by emergency department physician, Alvaro Daniels DO      EKG interpreted by me: Sinus rhythm, rate of 69, no acute ST elevations or depressions, left axis deviation, QTc 452, evidence of first-degree AV block, stable when compared to prior EKG on 2/28/2025          RADIOLOGY:   Non-plain film images such as CT, Ultrasound and MRI are read by the radiologist. Plain

## 2025-03-14 LAB
ANION GAP SERPL CALCULATED.3IONS-SCNC: 12 MMOL/L (ref 7–16)
BASOPHILS # BLD: 0.04 K/UL (ref 0–0.2)
BASOPHILS NFR BLD: 1 % (ref 0–2)
BUN SERPL-MCNC: 16 MG/DL (ref 6–23)
CALCIUM SERPL-MCNC: 8.4 MG/DL (ref 8.6–10.2)
CHLORIDE SERPL-SCNC: 108 MMOL/L (ref 98–107)
CO2 SERPL-SCNC: 19 MMOL/L (ref 22–29)
CREAT SERPL-MCNC: 1.3 MG/DL (ref 0.7–1.2)
EKG ATRIAL RATE: 69 BPM
EKG P AXIS: -2 DEGREES
EKG P-R INTERVAL: 212 MS
EKG Q-T INTERVAL: 422 MS
EKG QRS DURATION: 128 MS
EKG QTC CALCULATION (BAZETT): 452 MS
EKG R AXIS: -65 DEGREES
EKG T AXIS: 26 DEGREES
EKG VENTRICULAR RATE: 69 BPM
EOSINOPHIL # BLD: 0.21 K/UL (ref 0.05–0.5)
EOSINOPHILS RELATIVE PERCENT: 3 % (ref 0–6)
ERYTHROCYTE [DISTWIDTH] IN BLOOD BY AUTOMATED COUNT: 12.4 % (ref 11.5–15)
GFR, ESTIMATED: 49 ML/MIN/1.73M2
GLUCOSE BLD-MCNC: 148 MG/DL (ref 74–99)
GLUCOSE BLD-MCNC: 82 MG/DL (ref 74–99)
GLUCOSE SERPL-MCNC: 80 MG/DL (ref 74–99)
HBA1C MFR BLD: 5.6 % (ref 4–5.6)
HCT VFR BLD AUTO: 28.5 % (ref 37–54)
HGB BLD-MCNC: 9.6 G/DL (ref 12.5–16.5)
IMM GRANULOCYTES # BLD AUTO: 0.05 K/UL (ref 0–0.58)
IMM GRANULOCYTES NFR BLD: 1 % (ref 0–5)
LYMPHOCYTES NFR BLD: 1.7 K/UL (ref 1.5–4)
LYMPHOCYTES RELATIVE PERCENT: 23 % (ref 20–42)
MCH RBC QN AUTO: 30.8 PG (ref 26–35)
MCHC RBC AUTO-ENTMCNC: 33.7 G/DL (ref 32–34.5)
MCV RBC AUTO: 91.3 FL (ref 80–99.9)
MONOCYTES NFR BLD: 0.49 K/UL (ref 0.1–0.95)
MONOCYTES NFR BLD: 7 % (ref 2–12)
NEUTROPHILS NFR BLD: 66 % (ref 43–80)
NEUTS SEG NFR BLD: 4.85 K/UL (ref 1.8–7.3)
PLATELET # BLD AUTO: 327 K/UL (ref 130–450)
PMV BLD AUTO: 9.2 FL (ref 7–12)
POTASSIUM SERPL-SCNC: 4.7 MMOL/L (ref 3.5–5)
RBC # BLD AUTO: 3.12 M/UL (ref 3.8–5.8)
SODIUM SERPL-SCNC: 139 MMOL/L (ref 132–146)
T4 FREE SERPL-MCNC: 1.1 NG/DL (ref 0.9–1.7)
TSH SERPL DL<=0.05 MIU/L-ACNC: 2.02 UIU/ML (ref 0.27–4.2)
WBC OTHER # BLD: 7.3 K/UL (ref 4.5–11.5)

## 2025-03-14 PROCEDURE — 6370000000 HC RX 637 (ALT 250 FOR IP): Performed by: STUDENT IN AN ORGANIZED HEALTH CARE EDUCATION/TRAINING PROGRAM

## 2025-03-14 PROCEDURE — 2500000003 HC RX 250 WO HCPCS: Performed by: NURSE PRACTITIONER

## 2025-03-14 PROCEDURE — 1200000000 HC SEMI PRIVATE

## 2025-03-14 PROCEDURE — 97535 SELF CARE MNGMENT TRAINING: CPT

## 2025-03-14 PROCEDURE — 6360000002 HC RX W HCPCS: Performed by: NURSE PRACTITIONER

## 2025-03-14 PROCEDURE — 84443 ASSAY THYROID STIM HORMONE: CPT

## 2025-03-14 PROCEDURE — 85025 COMPLETE CBC W/AUTO DIFF WBC: CPT

## 2025-03-14 PROCEDURE — 36415 COLL VENOUS BLD VENIPUNCTURE: CPT

## 2025-03-14 PROCEDURE — 2500000003 HC RX 250 WO HCPCS: Performed by: STUDENT IN AN ORGANIZED HEALTH CARE EDUCATION/TRAINING PROGRAM

## 2025-03-14 PROCEDURE — 80048 BASIC METABOLIC PNL TOTAL CA: CPT

## 2025-03-14 PROCEDURE — 82962 GLUCOSE BLOOD TEST: CPT

## 2025-03-14 PROCEDURE — 83036 HEMOGLOBIN GLYCOSYLATED A1C: CPT

## 2025-03-14 PROCEDURE — 97161 PT EVAL LOW COMPLEX 20 MIN: CPT

## 2025-03-14 PROCEDURE — 93010 ELECTROCARDIOGRAM REPORT: CPT | Performed by: INTERNAL MEDICINE

## 2025-03-14 PROCEDURE — 97530 THERAPEUTIC ACTIVITIES: CPT

## 2025-03-14 PROCEDURE — 84439 ASSAY OF FREE THYROXINE: CPT

## 2025-03-14 PROCEDURE — 97165 OT EVAL LOW COMPLEX 30 MIN: CPT

## 2025-03-14 PROCEDURE — 6360000002 HC RX W HCPCS: Performed by: STUDENT IN AN ORGANIZED HEALTH CARE EDUCATION/TRAINING PROGRAM

## 2025-03-14 RX ORDER — MULTIVITAMIN WITH IRON
1 TABLET ORAL DAILY
Status: DISCONTINUED | OUTPATIENT
Start: 2025-03-14 | End: 2025-03-18 | Stop reason: HOSPADM

## 2025-03-14 RX ADMIN — HEPARIN SODIUM 5000 UNITS: 5000 INJECTION INTRAVENOUS; SUBCUTANEOUS at 21:30

## 2025-03-14 RX ADMIN — HEPARIN SODIUM 5000 UNITS: 5000 INJECTION INTRAVENOUS; SUBCUTANEOUS at 12:36

## 2025-03-14 RX ADMIN — PETROLATUM: 420 OINTMENT TOPICAL at 22:30

## 2025-03-14 RX ADMIN — CEFTRIAXONE SODIUM 2000 MG: 2 INJECTION, POWDER, FOR SOLUTION INTRAMUSCULAR; INTRAVENOUS at 10:01

## 2025-03-14 RX ADMIN — HEPARIN SODIUM 5000 UNITS: 5000 INJECTION INTRAVENOUS; SUBCUTANEOUS at 04:50

## 2025-03-14 RX ADMIN — SODIUM CHLORIDE, PRESERVATIVE FREE 10 ML: 5 INJECTION INTRAVENOUS at 22:30

## 2025-03-14 RX ADMIN — SODIUM CHLORIDE, PRESERVATIVE FREE 10 ML: 5 INJECTION INTRAVENOUS at 10:02

## 2025-03-14 NOTE — ED NOTES
Patient resting comfortably in bed, left lateral position with side rails x2 up. Patient is alert and oriented to person, place, and situation. Patient disoriented to time. Patient experiences intermittent confusion per previous nurse. Patient has no requests at this time. Call light and urinal  within reach

## 2025-03-14 NOTE — FLOWSHEET NOTE
Inpatient Wound Care (Initial consult) 8410A    Admit Date: 3/13/2025  2:59 PM    Reason for consult:  Buttocks, left hip, scrotum    Significant history: Per H&P    Chief Complaint:   FALL        History Of Present Illness:      89 y.o. male presented with FALL.  HE HAS A HISTORY OF DEMENTIA. NO FAMILY PRESENT,  APPARENTLY HE FELL OUT OF BED.  AND HE IS MORE CONFUSED.      Findings:     03/14/25 1442   Skin Integumentary    Skin Integrity   (Dry flaky)   Location BUE, BLE   Skin Integrity Site 2   Skin Integrity Location 2   (Erosion)   Location 2 bilateral buttocks, scrotum   Skin Integrity Site 3   Skin Integrity Location 3   (Old healed areas)    Location 3 bilateral hips   Wound 03/13/25 Buttocks Left   Date First Assessed/Time First Assessed: 03/13/25 7239   Present on Original Admission: Yes  Primary Wound Type: Pressure Injury  Location: Buttocks  Wound Location Orientation: Left   Wound Image    Wound Etiology Other  (Partial thickness)   Dressing/Treatment Pharmaceutical agent (see MAR)   Wound Length (cm) 7 cm   Wound Width (cm) 3 cm   Wound Depth (cm) 0.1 cm   Wound Surface Area (cm^2) 21 cm^2   Change in Wound Size % (l*w) 38.24   Wound Volume (cm^3) 2.1 cm^3   Wound Healing % 38   Wound Assessment Pink/red   Drainage Amount None (dry)   Odor None   Jaimie-wound Assessment Erosion       **Informed Consent**    The patient has given verbal consent to have photos taken of wounds and inserted into their chart as part of their permanent medical record for purposes of documentation, treatment management and/or medical review.   All Images taken on 3/14/25 of patient name: Paul Pemberton were transmitted and stored on secured Epic  Site located within Media Folder Tab by a registered [employer] Mobile Application Device.        Impression:  Left buttock: Partial thickness    Plan: Aquaphor  TAPS  Heel protectors  Patient will need continued preventative care        Bronwyn Esparza RN 3/14/2025 3:10 PM

## 2025-03-14 NOTE — ACP (ADVANCE CARE PLANNING)
Advance Care Planning   Healthcare Decision Maker:    Primary Decision Maker: TESS MORENORADHAMES - Child - [phone]    Secondary Decision Maker: Kaylynn Ha - Grandchild - [phone]    Click here to complete Healthcare Decision Makers including selection of the Healthcare Decision Maker Relationship (ie \"Primary\").  Today we documented Decision Maker(s) consistent with Legal Next of Kin hierarchy.       If the relationship to the patient does NOT follow our state's Next of Kin hierarchy, the patient MUST complete an ACP Document to allow him/her to act on the patient's behalf. :

## 2025-03-14 NOTE — H&P
[facility]   History and Physical      CHIEF COMPLAINT:  Fatigue (Family contacted EMS for increased weakness and falls. Family states patient fell and hit head a few days ago. -Thinners) and Altered Mental Status (Family states patient is more confused than normal)      Reason for Admission:  Failure to thrive in adult [R62.7]  FTT (failure to thrive) in adult [R62.7]  Altered mental status, unspecified altered mental status type [R41.82]    History Obtained From:  patient, electronic medical record    HISTORY OF PRESENT ILLNESS:      The patient is a 89 y.o. male of Guillaume Krishna DO  has a past medical history of Allergic rhinitis, Diabetic neuropathy (HCC), Hyperlipidemia, Hypertension, Osteoarthritis, and Type 2 diabetes mellitus without complication (HCC). who presents with Fatigue (Family contacted EMS for increased weakness and falls. Family states patient fell and hit head a few days ago. and Altered Mental Status (Family states patient is more confused than normal)    CT head, CT spine showed no acute process.  CXR showed cardiomegaly.  X-ray pelvics showed  no acute process, right hip prosthesis in anatomic alignment.    On exam, patient answers questions.  Oriented to self only.    All labs personally reviewed   All imaging personally reviewed     Past Medical History:        Diagnosis Date    Allergic rhinitis     Diabetic neuropathy (HCC)     Hyperlipidemia     Hypertension     Osteoarthritis     Type 2 diabetes mellitus without complication (HCC)      Past Surgical History:        Procedure Laterality Date    URETHRAL STRICTURE DILATATION           Medications Prior to Admission:    Medications Prior to Admission: bismuth subsalicylate (PEPTO BISMOL) 262 MG/15ML suspension, Take 30 mLs by mouth every 6 hours as needed for Indigestion or Other    Current Medications    Current Facility-Administered Medications:     sodium chloride flush 0.9 % injection 5-40 mL, 5-40 mL, IntraVENous,

## 2025-03-14 NOTE — PLAN OF CARE
Problem: Chronic Conditions and Co-morbidities  Goal: Patient's chronic conditions and co-morbidity symptoms are monitored and maintained or improved  3/14/2025 1054 by Debra Rios RN  Outcome: Progressing  3/14/2025 0218 by Leanne Connell RN  Outcome: Progressing     Problem: Safety - Adult  Goal: Free from fall injury  3/14/2025 1054 by Debra Rios RN  Outcome: Progressing  3/14/2025 0218 by Leanne Connell RN  Outcome: Progressing     Problem: ABCDS Injury Assessment  Goal: Absence of physical injury  3/14/2025 1054 by Debra Rios RN  Outcome: Progressing  3/14/2025 0218 by Leanne Connell RN  Outcome: Progressing     Problem: Skin/Tissue Integrity  Goal: Skin integrity remains intact  Description: 1.  Monitor for areas of redness and/or skin breakdown  2.  Assess vascular access sites hourly  3.  Every 4-6 hours minimum:  Change oxygen saturation probe site  4.  Every 4-6 hours:  If on nasal continuous positive airway pressure, respiratory therapy assess nares and determine need for appliance change or resting period  3/14/2025 1054 by Debra Rios RN  Outcome: Progressing  3/14/2025 0218 by Leanne Connell RN  Outcome: Progressing

## 2025-03-15 PROBLEM — E43 SEVERE PROTEIN-CALORIE MALNUTRITION: Status: ACTIVE | Noted: 2021-01-26

## 2025-03-15 PROBLEM — E43 SEVERE PROTEIN-CALORIE MALNUTRITION: Chronic | Status: ACTIVE | Noted: 2025-03-15

## 2025-03-15 LAB
ANION GAP SERPL CALCULATED.3IONS-SCNC: 14 MMOL/L (ref 7–16)
BASOPHILS # BLD: 0.04 K/UL (ref 0–0.2)
BASOPHILS NFR BLD: 1 % (ref 0–2)
BUN SERPL-MCNC: 26 MG/DL (ref 6–23)
CALCIUM SERPL-MCNC: 8.5 MG/DL (ref 8.6–10.2)
CHLORIDE SERPL-SCNC: 108 MMOL/L (ref 98–107)
CO2 SERPL-SCNC: 19 MMOL/L (ref 22–29)
CREAT SERPL-MCNC: 1.6 MG/DL (ref 0.7–1.2)
EOSINOPHIL # BLD: 0.23 K/UL (ref 0.05–0.5)
EOSINOPHILS RELATIVE PERCENT: 3 % (ref 0–6)
ERYTHROCYTE [DISTWIDTH] IN BLOOD BY AUTOMATED COUNT: 12.8 % (ref 11.5–15)
GFR, ESTIMATED: 40 ML/MIN/1.73M2
GLUCOSE BLD-MCNC: 126 MG/DL (ref 74–99)
GLUCOSE BLD-MCNC: 140 MG/DL (ref 74–99)
GLUCOSE BLD-MCNC: 157 MG/DL (ref 74–99)
GLUCOSE BLD-MCNC: 94 MG/DL (ref 74–99)
GLUCOSE SERPL-MCNC: 91 MG/DL (ref 74–99)
HCT VFR BLD AUTO: 30.3 % (ref 37–54)
HGB BLD-MCNC: 10 G/DL (ref 12.5–16.5)
IMM GRANULOCYTES # BLD AUTO: 0.04 K/UL (ref 0–0.58)
IMM GRANULOCYTES NFR BLD: 1 % (ref 0–5)
LYMPHOCYTES NFR BLD: 1.58 K/UL (ref 1.5–4)
LYMPHOCYTES RELATIVE PERCENT: 21 % (ref 20–42)
MCH RBC QN AUTO: 30.7 PG (ref 26–35)
MCHC RBC AUTO-ENTMCNC: 33 G/DL (ref 32–34.5)
MCV RBC AUTO: 92.9 FL (ref 80–99.9)
MONOCYTES NFR BLD: 0.76 K/UL (ref 0.1–0.95)
MONOCYTES NFR BLD: 10 % (ref 2–12)
NEUTROPHILS NFR BLD: 64 % (ref 43–80)
NEUTS SEG NFR BLD: 4.78 K/UL (ref 1.8–7.3)
PLATELET # BLD AUTO: 316 K/UL (ref 130–450)
PMV BLD AUTO: 9.1 FL (ref 7–12)
POTASSIUM SERPL-SCNC: 4.8 MMOL/L (ref 3.5–5)
RBC # BLD AUTO: 3.26 M/UL (ref 3.8–5.8)
SODIUM SERPL-SCNC: 141 MMOL/L (ref 132–146)
WBC OTHER # BLD: 7.4 K/UL (ref 4.5–11.5)

## 2025-03-15 PROCEDURE — 6360000002 HC RX W HCPCS: Performed by: NURSE PRACTITIONER

## 2025-03-15 PROCEDURE — 2500000003 HC RX 250 WO HCPCS: Performed by: STUDENT IN AN ORGANIZED HEALTH CARE EDUCATION/TRAINING PROGRAM

## 2025-03-15 PROCEDURE — 80048 BASIC METABOLIC PNL TOTAL CA: CPT

## 2025-03-15 PROCEDURE — 82962 GLUCOSE BLOOD TEST: CPT

## 2025-03-15 PROCEDURE — 36415 COLL VENOUS BLD VENIPUNCTURE: CPT

## 2025-03-15 PROCEDURE — 6370000000 HC RX 637 (ALT 250 FOR IP): Performed by: STUDENT IN AN ORGANIZED HEALTH CARE EDUCATION/TRAINING PROGRAM

## 2025-03-15 PROCEDURE — 2500000003 HC RX 250 WO HCPCS: Performed by: NURSE PRACTITIONER

## 2025-03-15 PROCEDURE — 1200000000 HC SEMI PRIVATE

## 2025-03-15 PROCEDURE — 6360000002 HC RX W HCPCS: Performed by: STUDENT IN AN ORGANIZED HEALTH CARE EDUCATION/TRAINING PROGRAM

## 2025-03-15 PROCEDURE — 85025 COMPLETE CBC W/AUTO DIFF WBC: CPT

## 2025-03-15 RX ADMIN — SODIUM CHLORIDE, PRESERVATIVE FREE 10 ML: 5 INJECTION INTRAVENOUS at 21:25

## 2025-03-15 RX ADMIN — PETROLATUM: 420 OINTMENT TOPICAL at 21:25

## 2025-03-15 RX ADMIN — MULTIVITAMIN TABLET 1 TABLET: TABLET at 08:12

## 2025-03-15 RX ADMIN — HEPARIN SODIUM 5000 UNITS: 5000 INJECTION INTRAVENOUS; SUBCUTANEOUS at 21:25

## 2025-03-15 RX ADMIN — HEPARIN SODIUM 5000 UNITS: 5000 INJECTION INTRAVENOUS; SUBCUTANEOUS at 12:46

## 2025-03-15 RX ADMIN — SODIUM CHLORIDE, PRESERVATIVE FREE 10 ML: 5 INJECTION INTRAVENOUS at 08:15

## 2025-03-15 RX ADMIN — PETROLATUM: 420 OINTMENT TOPICAL at 08:15

## 2025-03-15 RX ADMIN — HEPARIN SODIUM 5000 UNITS: 5000 INJECTION INTRAVENOUS; SUBCUTANEOUS at 05:51

## 2025-03-15 RX ADMIN — CEFTRIAXONE SODIUM 2000 MG: 2 INJECTION, POWDER, FOR SOLUTION INTRAMUSCULAR; INTRAVENOUS at 08:12

## 2025-03-15 NOTE — PLAN OF CARE
Problem: Chronic Conditions and Co-morbidities  Goal: Patient's chronic conditions and co-morbidity symptoms are monitored and maintained or improved  3/15/2025 1104 by Luli Crowder RN  Outcome: Progressing  3/15/2025 0019 by Leanne Connell RN  Outcome: Progressing     Problem: Safety - Adult  Goal: Free from fall injury  3/15/2025 1104 by Luli Crowder RN  Outcome: Progressing  3/15/2025 0019 by Leanne Connell RN  Outcome: Progressing     Problem: ABCDS Injury Assessment  Goal: Absence of physical injury  3/15/2025 1104 by Luli Crowder RN  Outcome: Progressing  3/15/2025 0019 by Leanne Connell RN  Outcome: Progressing     Problem: Skin/Tissue Integrity  Goal: Skin integrity remains intact  Description: 1.  Monitor for areas of redness and/or skin breakdown  2.  Assess vascular access sites hourly  3.  Every 4-6 hours minimum:  Change oxygen saturation probe site  4.  Every 4-6 hours:  If on nasal continuous positive airway pressure, respiratory therapy assess nares and determine need for appliance change or resting period  3/15/2025 1104 by Luli Crowder RN  Outcome: Progressing  3/15/2025 0019 by Leanne Connell RN  Outcome: Progressing

## 2025-03-16 LAB
ANION GAP SERPL CALCULATED.3IONS-SCNC: 12 MMOL/L (ref 7–16)
ANION GAP SERPL CALCULATED.3IONS-SCNC: 12 MMOL/L (ref 7–16)
BASOPHILS # BLD: 0.03 K/UL (ref 0–0.2)
BASOPHILS NFR BLD: 1 % (ref 0–2)
BUN SERPL-MCNC: 29 MG/DL (ref 6–23)
BUN SERPL-MCNC: 33 MG/DL (ref 6–23)
CALCIUM SERPL-MCNC: 8 MG/DL (ref 8.6–10.2)
CALCIUM SERPL-MCNC: 8.5 MG/DL (ref 8.6–10.2)
CHLORIDE SERPL-SCNC: 102 MMOL/L (ref 98–107)
CHLORIDE SERPL-SCNC: 110 MMOL/L (ref 98–107)
CO2 SERPL-SCNC: 19 MMOL/L (ref 22–29)
CO2 SERPL-SCNC: 19 MMOL/L (ref 22–29)
CREAT SERPL-MCNC: 1.4 MG/DL (ref 0.7–1.2)
CREAT SERPL-MCNC: 1.4 MG/DL (ref 0.7–1.2)
EOSINOPHIL # BLD: 0.25 K/UL (ref 0.05–0.5)
EOSINOPHILS RELATIVE PERCENT: 4 % (ref 0–6)
ERYTHROCYTE [DISTWIDTH] IN BLOOD BY AUTOMATED COUNT: 12.9 % (ref 11.5–15)
GFR, ESTIMATED: 46 ML/MIN/1.73M2
GFR, ESTIMATED: 48 ML/MIN/1.73M2
GLUCOSE BLD-MCNC: 101 MG/DL (ref 74–99)
GLUCOSE BLD-MCNC: 133 MG/DL (ref 74–99)
GLUCOSE BLD-MCNC: 150 MG/DL (ref 74–99)
GLUCOSE BLD-MCNC: 162 MG/DL (ref 74–99)
GLUCOSE SERPL-MCNC: 108 MG/DL (ref 74–99)
GLUCOSE SERPL-MCNC: 190 MG/DL (ref 74–99)
HCT VFR BLD AUTO: 30.1 % (ref 37–54)
HGB BLD-MCNC: 9.9 G/DL (ref 12.5–16.5)
IMM GRANULOCYTES # BLD AUTO: 0.04 K/UL (ref 0–0.58)
IMM GRANULOCYTES NFR BLD: 1 % (ref 0–5)
LYMPHOCYTES NFR BLD: 1.67 K/UL (ref 1.5–4)
LYMPHOCYTES RELATIVE PERCENT: 25 % (ref 20–42)
MCH RBC QN AUTO: 30.5 PG (ref 26–35)
MCHC RBC AUTO-ENTMCNC: 32.9 G/DL (ref 32–34.5)
MCV RBC AUTO: 92.6 FL (ref 80–99.9)
MONOCYTES NFR BLD: 0.65 K/UL (ref 0.1–0.95)
MONOCYTES NFR BLD: 10 % (ref 2–12)
NEUTROPHILS NFR BLD: 60 % (ref 43–80)
NEUTS SEG NFR BLD: 3.99 K/UL (ref 1.8–7.3)
PLATELET # BLD AUTO: 319 K/UL (ref 130–450)
PMV BLD AUTO: 8.9 FL (ref 7–12)
POTASSIUM SERPL-SCNC: 5.1 MMOL/L (ref 3.5–5)
POTASSIUM SERPL-SCNC: 5.2 MMOL/L (ref 3.5–5)
RBC # BLD AUTO: 3.25 M/UL (ref 3.8–5.8)
SODIUM SERPL-SCNC: 133 MMOL/L (ref 132–146)
SODIUM SERPL-SCNC: 141 MMOL/L (ref 132–146)
WBC OTHER # BLD: 6.6 K/UL (ref 4.5–11.5)

## 2025-03-16 PROCEDURE — 1200000000 HC SEMI PRIVATE

## 2025-03-16 PROCEDURE — 85025 COMPLETE CBC W/AUTO DIFF WBC: CPT

## 2025-03-16 PROCEDURE — 36415 COLL VENOUS BLD VENIPUNCTURE: CPT

## 2025-03-16 PROCEDURE — 82962 GLUCOSE BLOOD TEST: CPT

## 2025-03-16 PROCEDURE — 2500000003 HC RX 250 WO HCPCS: Performed by: NURSE PRACTITIONER

## 2025-03-16 PROCEDURE — 6360000002 HC RX W HCPCS: Performed by: STUDENT IN AN ORGANIZED HEALTH CARE EDUCATION/TRAINING PROGRAM

## 2025-03-16 PROCEDURE — 80048 BASIC METABOLIC PNL TOTAL CA: CPT

## 2025-03-16 PROCEDURE — 6370000000 HC RX 637 (ALT 250 FOR IP): Performed by: STUDENT IN AN ORGANIZED HEALTH CARE EDUCATION/TRAINING PROGRAM

## 2025-03-16 PROCEDURE — 2500000003 HC RX 250 WO HCPCS: Performed by: STUDENT IN AN ORGANIZED HEALTH CARE EDUCATION/TRAINING PROGRAM

## 2025-03-16 PROCEDURE — 6360000002 HC RX W HCPCS: Performed by: NURSE PRACTITIONER

## 2025-03-16 PROCEDURE — 6370000000 HC RX 637 (ALT 250 FOR IP): Performed by: INTERNAL MEDICINE

## 2025-03-16 RX ADMIN — PETROLATUM: 420 OINTMENT TOPICAL at 21:46

## 2025-03-16 RX ADMIN — SODIUM CHLORIDE, PRESERVATIVE FREE 10 ML: 5 INJECTION INTRAVENOUS at 21:47

## 2025-03-16 RX ADMIN — SODIUM ZIRCONIUM CYCLOSILICATE 10 G: 10 POWDER, FOR SUSPENSION ORAL at 16:27

## 2025-03-16 RX ADMIN — HEPARIN SODIUM 5000 UNITS: 5000 INJECTION INTRAVENOUS; SUBCUTANEOUS at 12:09

## 2025-03-16 RX ADMIN — HEPARIN SODIUM 5000 UNITS: 5000 INJECTION INTRAVENOUS; SUBCUTANEOUS at 21:47

## 2025-03-16 RX ADMIN — CEFTRIAXONE SODIUM 2000 MG: 2 INJECTION, POWDER, FOR SOLUTION INTRAMUSCULAR; INTRAVENOUS at 08:12

## 2025-03-16 RX ADMIN — MULTIVITAMIN TABLET 1 TABLET: TABLET at 08:11

## 2025-03-16 RX ADMIN — SODIUM ZIRCONIUM CYCLOSILICATE 10 G: 10 POWDER, FOR SUSPENSION ORAL at 21:47

## 2025-03-16 RX ADMIN — SODIUM CHLORIDE, PRESERVATIVE FREE 10 ML: 5 INJECTION INTRAVENOUS at 08:14

## 2025-03-16 RX ADMIN — PETROLATUM: 420 OINTMENT TOPICAL at 08:17

## 2025-03-16 RX ADMIN — HEPARIN SODIUM 5000 UNITS: 5000 INJECTION INTRAVENOUS; SUBCUTANEOUS at 04:55

## 2025-03-16 NOTE — PLAN OF CARE
Problem: Chronic Conditions and Co-morbidities  Goal: Patient's chronic conditions and co-morbidity symptoms are monitored and maintained or improved  Outcome: Progressing     Problem: Safety - Adult  Goal: Free from fall injury  Outcome: Progressing     Problem: Skin/Tissue Integrity  Goal: Skin integrity remains intact  Description: 1.  Monitor for areas of redness and/or skin breakdown  2.  Assess vascular access sites hourly  3.  Every 4-6 hours minimum:  Change oxygen saturation probe site  4.  Every 4-6 hours:  If on nasal continuous positive airway pressure, respiratory therapy assess nares and determine need for appliance change or resting period  Outcome: Progressing

## 2025-03-16 NOTE — PLAN OF CARE
Problem: Chronic Conditions and Co-morbidities  Goal: Patient's chronic conditions and co-morbidity symptoms are monitored and maintained or improved  Outcome: Progressing     Problem: Safety - Adult  Goal: Free from fall injury  Outcome: Progressing     Problem: ABCDS Injury Assessment  Goal: Absence of physical injury  Outcome: Progressing  Flowsheets (Taken 3/16/2025 0133)  Absence of Physical Injury: Implement safety measures based on patient assessment

## 2025-03-17 VITALS
OXYGEN SATURATION: 98 % | RESPIRATION RATE: 16 BRPM | SYSTOLIC BLOOD PRESSURE: 131 MMHG | DIASTOLIC BLOOD PRESSURE: 59 MMHG | HEIGHT: 62 IN | HEART RATE: 68 BPM | WEIGHT: 111.33 LBS | TEMPERATURE: 98.6 F | BODY MASS INDEX: 20.49 KG/M2

## 2025-03-17 LAB
ANION GAP SERPL CALCULATED.3IONS-SCNC: 16 MMOL/L (ref 7–16)
BUN SERPL-MCNC: 31 MG/DL (ref 6–23)
CALCIUM SERPL-MCNC: 8.2 MG/DL (ref 8.6–10.2)
CHLORIDE SERPL-SCNC: 105 MMOL/L (ref 98–107)
CO2 SERPL-SCNC: 17 MMOL/L (ref 22–29)
CREAT SERPL-MCNC: 1.4 MG/DL (ref 0.7–1.2)
GFR, ESTIMATED: 49 ML/MIN/1.73M2
GLUCOSE BLD-MCNC: 119 MG/DL (ref 74–99)
GLUCOSE BLD-MCNC: 149 MG/DL (ref 74–99)
GLUCOSE BLD-MCNC: 165 MG/DL (ref 74–99)
GLUCOSE SERPL-MCNC: 197 MG/DL (ref 74–99)
POTASSIUM SERPL-SCNC: 4.7 MMOL/L (ref 3.5–5)
SODIUM SERPL-SCNC: 138 MMOL/L (ref 132–146)

## 2025-03-17 PROCEDURE — 2500000003 HC RX 250 WO HCPCS: Performed by: STUDENT IN AN ORGANIZED HEALTH CARE EDUCATION/TRAINING PROGRAM

## 2025-03-17 PROCEDURE — 2500000003 HC RX 250 WO HCPCS: Performed by: NURSE PRACTITIONER

## 2025-03-17 PROCEDURE — 80048 BASIC METABOLIC PNL TOTAL CA: CPT

## 2025-03-17 PROCEDURE — 6370000000 HC RX 637 (ALT 250 FOR IP): Performed by: INTERNAL MEDICINE

## 2025-03-17 PROCEDURE — 36415 COLL VENOUS BLD VENIPUNCTURE: CPT

## 2025-03-17 PROCEDURE — 1200000000 HC SEMI PRIVATE

## 2025-03-17 PROCEDURE — 6370000000 HC RX 637 (ALT 250 FOR IP): Performed by: STUDENT IN AN ORGANIZED HEALTH CARE EDUCATION/TRAINING PROGRAM

## 2025-03-17 PROCEDURE — 6360000002 HC RX W HCPCS: Performed by: STUDENT IN AN ORGANIZED HEALTH CARE EDUCATION/TRAINING PROGRAM

## 2025-03-17 PROCEDURE — 6360000002 HC RX W HCPCS: Performed by: NURSE PRACTITIONER

## 2025-03-17 PROCEDURE — 82962 GLUCOSE BLOOD TEST: CPT

## 2025-03-17 RX ORDER — MULTIVITAMIN WITH IRON
1 TABLET ORAL DAILY
DISCHARGE
Start: 2025-03-18

## 2025-03-17 RX ADMIN — CEFTRIAXONE SODIUM 2000 MG: 2 INJECTION, POWDER, FOR SOLUTION INTRAMUSCULAR; INTRAVENOUS at 09:11

## 2025-03-17 RX ADMIN — HEPARIN SODIUM 5000 UNITS: 5000 INJECTION INTRAVENOUS; SUBCUTANEOUS at 04:08

## 2025-03-17 RX ADMIN — PETROLATUM: 420 OINTMENT TOPICAL at 09:12

## 2025-03-17 RX ADMIN — HEPARIN SODIUM 5000 UNITS: 5000 INJECTION INTRAVENOUS; SUBCUTANEOUS at 10:58

## 2025-03-17 RX ADMIN — MULTIVITAMIN TABLET 1 TABLET: TABLET at 09:12

## 2025-03-17 RX ADMIN — HEPARIN SODIUM 5000 UNITS: 5000 INJECTION INTRAVENOUS; SUBCUTANEOUS at 21:47

## 2025-03-17 RX ADMIN — PETROLATUM: 420 OINTMENT TOPICAL at 21:48

## 2025-03-17 RX ADMIN — SODIUM CHLORIDE, PRESERVATIVE FREE 10 ML: 5 INJECTION INTRAVENOUS at 09:11

## 2025-03-17 RX ADMIN — SODIUM ZIRCONIUM CYCLOSILICATE 10 G: 10 POWDER, FOR SUSPENSION ORAL at 09:11

## 2025-03-17 NOTE — DISCHARGE SUMMARY
[facility]   Discharge summary   Patient ID:  Paul Pemberton  [id number]  89 y.o.  9/5/1929    Admit date: 3/13/2025    Discharge date and time: 3/17/2025    Admission Diagnoses:   Patient Active Problem List   Diagnosis    Type II diabetes mellitus (HCC)    Urethral stricture    Stage 3b chronic kidney disease (HCC)    Intrinsic eczema    Severe protein-calorie malnutrition    Alzheimer's disease with late onset (HCC)    UTI (urinary tract infection)    Fall    REGINALDO (acute kidney injury)    FTT (failure to thrive) in adult    Unable to care for self    Falls       Discharge Diagnoses: FTT, generalized weakness    Consults: none    Procedures: none    Hospital Course: The patient is a 89 y.o. male of Guillaume Krishna DO     89-year-old male presenting with a fall and admitted with  Failure to thrive in adult   FTT (failure to thrive) in adult   Altered mental status, unspecified altered mental status type   Generalized weakness  -Presents with fatigue and nonspecific symptoms  -CT head, CT pulmonary, CT abdomen and pelvics  -Check TSH, A1c, CBC and CMP and renal function  -Check acute hepatic panel  -Rule out respiratory infectious process  -Review medication list  -Start IV fluid and monitor for fluid overload  -PT OT and social work consult  -Evaluate for need for SNF/rehab placement      Acute Cystitis  - UA showed pyuria, leukocytes esterase and nitrite  - urine culture pending   - start IV antibiotics with rocephin   - start IV fluid   - monitor renal function     Multiple wounds  -Wound care        3/15/2025  Failure to thrive, needs placement  Resting comfortably in no acute distress  TeleSitter in place  Continue antibiotic therapy-urine culture is currently pending, will have to finalize antibiotics based on culture results prior to discharge  No acute complaints today-he wants ice cream  BUN/creatinine 26/1.6  Await placement issues for failure to thrive  Labs and vitals are within normal

## 2025-03-17 NOTE — DISCHARGE INSTR - COC
Continuity of Care Form    Patient Name: Paul Pemberton   :  1929  MRN:  [id number]    Admit date:  3/13/2025  Discharge date:  3/17/2025    Code Status Order: Full Code   Advance Directives:     Admitting Physician:  No admitting provider for patient encounter.  PCP: Guillaume Krishna DO    Discharging Nurse: armaan freeman RN  Discharging Hospital Unit/Room#: 8410/8410-A  Discharging Unit Phone Number: [phone]    Emergency Contact:   Extended Emergency Contact Information  Primary Emergency Contact: PAUL PEMBERTON JR   [facility]  Home Phone: [phone]  Mobile Phone: [phone]  Relation: Child  Secondary Emergency Contact: Kaylynn Ha   [facility]  Home Phone: [phone]  Relation: Grandchild    Past Surgical History:  Past Surgical History:   Procedure Laterality Date    URETHRAL STRICTURE DILATATION         Immunization History:   Immunization History   Administered Date(s) Administered    COVID-19, MODERNA BLUE border, Primary or Immunocompromised, (age 12y+), IM, 100 mcg/0.5mL 2021, 2021    COVID-19, PFIZER GRAY top, DO NOT Dilute, (age 12 y+), IM, 30 mcg/0.3 mL 2022    Influenza Virus Vaccine 2017    Influenza, FLUAD, (age 65 y+), IM, Quadv, 0.5mL 2020, 2021    Influenza, FLUAD, (age 65 y+), IM, Trivalent PF, 0.5mL 2019    Influenza, FLUZONE High Dose (age 65 y+), IM, Quadv, 0.7mL 2022    Influenza, FLUZONE High Dose, (age 65 y+), IM, Trivalent PF, 0.5mL 2016, 2018    Pneumococcal, PCV-13, PREVNAR 13, (age 6w+), IM, 0.5mL 2016    Pneumococcal, PPSV23, PNEUMOVAX 23, (age 2y+), SC/IM, 0.5mL 2021       Active Problems:  Patient Active Problem List   Diagnosis Code    Type II diabetes mellitus (HCC) E11.9    Urethral stricture N35.919    Stage 3b chronic kidney disease (HCC) N18.32    Intrinsic eczema L20.84    Severe protein-calorie malnutrition E43    Alzheimer's disease with late onset (HCC) G30.1, F02.80

## 2025-03-17 NOTE — FLOWSHEET NOTE
Attempted to call nurse to nurse 4 times to [facility].  [facility] does not know the unit that pt is assigned to. Left message with 2 different units to call st [facility] for report

## 2025-03-17 NOTE — CARE COORDINATION
CM update note.  Spoke with patient son via phone.  Went over facilities in the [facility].  He would like a referral to [facility].  Referral called to [facility].      1035:  [facility] has accepted.  Will submit for precert today.  No need to wait for precert to admit to [facility].  Dheeraj/destination/7000 completed.  Ambulance form with envelope completed.   NP updated via perfect serve.  Discharge is pending labs.       1410:  discharge order noted.  Transport set up via stretcher with [facility].   time is between 5100-6574.  Nurse will need to call report to [phone].  Facility liaison, patient son and RN notified of  time.  Mariana with [facility] notified of discharge plan.  Timo Goodman RN [phone].   
Social Work /Transition of Care:    Pt presents to the ED secondary to failure to thrive at home.  Per report from Mariana with [facility], pt has hx of dementia and lives with his son, Paul.  APS reports pt was laying on a dirty mattress with dried feces, urine, and bed bugs.  House was deplorable.  Pt was only alert to person and place.  Pt has been having difficulty ambulating and in need of rehab.  Family would like pt to go to [facility].    Pt was decontaminated upon arrival to the ED.    SW spoke to pt's son, Paul, who states he would like pt to go to [facility] or [facility].  SW made both referrals but neither place has a bed available.  Pt's son states he will be coming to the hospital and requested SW leave a list of other options.    JACQUE/ANNELISE to follow up with pt's son.  
treatment goals of Failure to thrive in adult [R62.7]  FTT (failure to thrive) in adult [R62.7]  Altered mental status, unspecified altered mental status type [R41.82]    IF APPLICABLE: The Patient and/or patient representative Paul and his family were provided with a choice of provider and agrees with the discharge plan. Freedom of choice list with basic dialogue that supports the patient's individualized plan of care/goals and shares the quality data associated with the providers was provided to: Patient Representative   Patient Representative Name: Dwight Miller     The Patient and/or Patient Representative Agree with the Discharge Plan? Yes    Gunjan Goodman RN  Case Management Department  Ph: [phone] Fax:

## 2025-03-19 NOTE — PROGRESS NOTES
Physician Progress Note      PATIENT:               RADHAMES PULIDO  [facility] #:                  [id number]  :                       1929  ADMIT DATE:       3/13/2025 2:59 PM  DISCH DATE:        3/17/2025 11:00 PM  RESPONDING  PROVIDER #:        Ana Taylor MD          QUERY TEXT:    Patient admitted with failure to thrive. Noted documentation of acute cystitis   in H&P and subsequent internal med progress notes from 3/14-3/16. Pt found to   have UA with trace leukocyte esterase, negative nitrites, 6-9 WBC's, and   trace bacteria with no documentation of dysuria, urgency, or frequency. In   order to support the diagnosis of acute cystitis, please include additional   clinical indicators in your documentation.  Or please document if the   diagnosis of acute cystitis has been ruled out after further study.    The medical record reflects the following:  Risk Factors: AMS, age 89  Clinical Indicators: UA from 3/13 shows trace leukocyte esterase, negative   nitrites, 6-9 WBC's, and trace bacteria; no dysuria, urinary frequency, or   urgency noted, no urine cx  Treatment: IV Rocephin, UA, Urine cx (ordered but not done)  Options provided:  -- Acute cystitis present as evidenced by, Please document evidence.  -- Acute cystitis was ruled out  -- Other - I will add my own diagnosis  -- Disagree - Not applicable / Not valid  -- Disagree - Clinically unable to determine / Unknown  -- Refer to Clinical Documentation Reviewer    PROVIDER RESPONSE TEXT:    Acute cystitis was ruled out after study.    Query created by: Gunjan Overton on 3/17/2025 11:55 AM      QUERY TEXT:    Patient admitted with failure to thrive. Noted to have severe malnutrition in   dietitian consult note on 3/15. If possible, please document in progress notes   and discharge summary if you are evaluating and /or treating any of the   following:    The medical record reflects the following:  Risk Factors: Failure to thrive, AMS  Clinical Indicators: Per 
4 Eyes Skin Assessment     NAME:  Paul Pemberton  YOB: 1929  MEDICAL RECORD NUMBER:  [id number]    The patient is being assessed for  Admission    I agree that at least one RN has performed a thorough Head to Toe Skin Assessment on the patient. ALL assessment sites listed below have been assessed.      Areas assessed by both nurses:    Head, Face, Ears, Shoulders, Back, Chest, Arms, Elbows, Hands, Sacrum. Buttock, Coccyx, Ischium, Legs. Feet and Heels, and Under Medical Devices       Left buttock wound- 10x3.4x0.1    Right buttock wound- 3x3x0.1    Right hip wound- 2x2x0.1    Unmeasurable scrotum wound    Generalized skin dry, cracked, flaky  Does the Patient have a Wound? Yes wound(s) were present on assessment. LDA wound assessment was Initiated and completed by RN       Abdirahman Prevention initiated by RN: Yes  Wound Care Orders initiated by RN: Yes    Pressure Injury (Stage 3,4, Unstageable, DTI, NWPT, and Complex wounds) if present, place Wound referral order by RN under : No    New Ostomies, if present place, Ostomy referral order under : No     Nurse 1 eSignature: Electronically signed by Leanne Connell RN on 3/14/25 at 1:52 AM EDT    **SHARE this note so that the co-signing nurse can place an eSignature**    Nurse 2 eSignature: Electronically signed by Leonor Wheeler RN on 3/14/25 at 4:09 AM EDT   
Attempted to call [facility] but no answer.  
Attempted to call [facility] for nurse to nurse report but no answer.  
Bed alarm placed on for pt safety   
Comprehensive Nutrition Assessment    Type and Reason for Visit:  Initial, Consult, Wound    Nutrition Recommendations/Plan:   Recommend and start Glucerna supplement TID and Michael wound healing supplement BID to help meet increased nutritional needs from wound healing and d/t decreased po intake of meals.         Malnutrition Assessment:  Malnutrition Status:  Severe malnutrition (03/15/25 1222)    Context:  Chronic Illness     Findings of the 6 clinical characteristics of malnutrition:  Energy Intake:  75% or less estimated energy requirements for 1 month or longer  Weight Loss:  Unable to assess (d/t poor recent actual weight history)     Body Fat Loss:  Severe body fat loss Orbital, Triceps   Muscle Mass Loss:  Severe muscle mass loss Temples (temporalis), Clavicles (pectoralis & deltoids)  Fluid Accumulation:  No fluid accumulation     Strength:  Not Performed    Nutrition Assessment:    Patient adm w/ fatigue and weakness ; s/p fall ; also adm w/ AMS and FTT (per EMS report, the house has unlivable conditions) ; noted acute cystitis ; hx of malnutrition ; pt does meet criteria for severe malnutrition ; hx of Alzheimer's dementia ; hx of DM/CKD/hyperlipidemia/hypertension ;  wound noted ; will provide recommendations    Nutrition Related Findings:    I&Os WNL, no edema, A&O x 2, active BS, redness/excoriation to buttocks, old healed areas, muscle/fat wasting, cachexia ; Wound Type: Open Wounds, Partial Thickness (wound x 1)       Current Nutrition Intake & Therapies:    Average Meal Intake: 26-50%     ADULT DIET; Regular; 4 carb choices (60 gm/meal); Low Fat/Low Chol/High Fiber/MACK; Low Sodium (2 gm); 1500 ml    Anthropometric Measures:  Height: 157.5 cm (5' 2\")  Ideal Body Weight (IBW): 118 lbs (54 kg)    Admission Body Weight: 51.3 kg (113 lb) (3/15, bedscale)  Current Body Weight: 51.3 kg (113 lb) (3/15, bedscale), 95.8 % IBW. Weight Source: Bed scale  Current BMI (kg/m2): 20.7  Usual Body Weight:  (UTO 
Occupational Therapy  OCCUPATIONAL THERAPY INITIAL EVALUATION    [facility]  [address] [place], [place]      Date:3/14/2025                                                Patient Name: Paul Pemberton  MRN: [id number]  : 1929  Room: [address] [place] [address]-A    Evaluating OT: Chepe Lee OTR/L #[id number]     Referring Provider: Reji ALEN - CNP   Specific Provider Orders/Date: OT eval and treat 3/13/25    Diagnosis: Failure to thrive in adult [R62.7]  FTT (failure to thrive) in adult [R62.7]  Altered mental status, unspecified altered mental status type [R41.82]   Pt admitted to hospital with failure to thrive and AMS     Pertinent Medical History:  has a past medical history of Allergic rhinitis, Diabetic neuropathy (HCC), Hyperlipidemia, Hypertension, Osteoarthritis, and Type 2 diabetes mellitus without complication (HCC).       Precautions:  Fall Risk, cognition, bed alarm     Assessment of current deficits    [x] Functional mobility  [x]ADLs  [x] Strength               [x]Cognition    [x] Functional transfers   [x] IADLs         [x] Safety Awareness   [x]Endurance    [] Fine Coordination              [x] Balance      [] Vision/perception   []Sensation     []Gross Motor Coordination  [] ROM  [] Delirium                   [] Motor Control     OT PLAN OF CARE   OT POC based on physician orders, patient diagnosis and results of clinical assessment    Frequency/Duration 1-5 days/wk for 2 weeks PRN   Specific OT Treatment Interventions to include:   * Instruction/training on adapted ADL techniques and AE recommendations to increase functional independence within precautions       * Training on energy conservation strategies, correct breathing pattern and techniques to improve independence/tolerance for self-care routine  * Functional transfer/mobility training/DME recommendations for increased independence, safety, and fall prevention  * Patient/Family 
[facility] eta now 2000-2200  
Patient left the [facility] via [facility].  
Pt incontinent of urine.  Clean breif placed on pt  
UA showed pyuria, leukocytes esterase and nitrite  - urine culture pending   - start IV antibiotics with rocephin   - start IV fluid   - monitor renal function     Multiple wounds  -Wound care      3/15/2025  Failure to thrive, needs placement  Resting comfortably in no acute distress  TeleSitter in place  Continue antibiotic therapy-urine culture is currently pending, will have to finalize antibiotics based on culture results prior to discharge  No acute complaints today-he wants ice cream  BUN/creatinine 26/1.6  Await placement issues for failure to thrive  Labs and vitals are within normal limits     Code Status: Full code  Consultants: Renal  DVT Prophylaxis   PT/OT  Discharge planning        Ana Taylor MD  11:38 AM  3/15/2025  
commands with proper cues and increased time. Pleasantly confused  Sensation:   WNL  Edema:  WNL    Vitals:      Spo2 97%  PRE    Spo2 95%   ACTIVITY    Therapeutic Exercises:  Functional mobility    Patient education  Pt educated on role of PT    Patient response to education:   Pt verbalized understanding Pt demonstrated skill Pt requires further education in this area   x x x     ASSESSMENT:    Conditions Requiring Skilled Therapeutic Intervention:    [x]Decreased strength     [x]Decreased ROM  [x]Decreased functional mobility  [x]Decreased balance   [x]Decreased endurance   []Decreased posture  []Decreased sensation  [x]Decreased coordination   []Decreased vision  [x]Decreased safety awareness   []Increased pain       Comments:  Pt agreeable to PT evaluation. Pt pleasantly confused. Pt presents with strength, balance, and endurance deficits. Pt requiring assistance and cues for bed mobility, transfers, and ambulation. Ambulation distance limited by fatigue. Patient would benefit from continued skilled PT to maximize functional mobility independence.   Bed alarm activated    Treatment:  Patient practiced and was instructed in the following treatment:    Bed mobility- verbal cues to facilitate independence  Functional transfers- Verbal cues for proper positioning and sequencing to perform transfers safely with maximum independence.   Gait training-Verbal cues for proper positioning and sequencing using assistive device to maximize functional mobility independence.      Pt's/ family goals   1. Get better    Prognosis is good for reaching above PT goals.    Patient and or family understand(s) diagnosis, prognosis, and plan of care.  yes    PHYSICAL THERAPY PLAN OF CARE:    PT POC is established based on physician order and patient diagnosis     Referring provider/PT Order:    03/13/25 2015  PT eval and treat  Start:  03/13/25 2015,   End:  03/13/25 2015,   ONE TIME,   Standing Count:  1 Occurrences,   R  
overload  -PT OT and social work consult  -Evaluate for need for SNF/rehab placement      Acute Cystitis  - UA showed pyuria, leukocytes esterase and nitrite  - urine culture pending   - start IV antibiotics with rocephin   - start IV fluid   - monitor renal function     Multiple wounds  -Wound care      3/15/2025  Failure to thrive, needs placement  Resting comfortably in no acute distress  TeleSitter in place  Continue antibiotic therapy-urine culture is currently pending, will have to finalize antibiotics based on culture results prior to discharge  No acute complaints today-he wants ice cream  BUN/creatinine 26/1.6  Await placement issues for failure to thrive  Labs and vitals are within normal limits    3/16/2025  No acute events or issues overnight  Await pre-CERT for rehab  Mild elevation in potassium with BUN/creatinine 29/1.4-continue to monitor  Recheck BMP this evening to ensure no further rise in potassium  If this evening's potassium normalizes after [place], he can be discharged if pre-CERT has been obtained     Code Status: Full code  Consultants: Renal  DVT Prophylaxis   PT/OT  Discharge planning        Ana Taylor MD  9:25 AM  3/16/2025

## 2025-03-26 ENCOUNTER — HOSPITAL ENCOUNTER (INPATIENT)
Age: 89
LOS: 10 days | Discharge: NURSING, REHAB OR LONG-TERM CARE | End: 2025-04-05
Attending: EMERGENCY MEDICINE | Admitting: STUDENT IN AN ORGANIZED HEALTH CARE EDUCATION/TRAINING PROGRAM
Payer: MEDICARE

## 2025-03-26 DIAGNOSIS — E87.5 HYPERKALEMIA: ICD-10-CM

## 2025-03-26 DIAGNOSIS — R62.7 FTT (FAILURE TO THRIVE) IN ADULT: ICD-10-CM

## 2025-03-26 DIAGNOSIS — E87.0 HYPERNATREMIA: Primary | ICD-10-CM

## 2025-03-26 DIAGNOSIS — N17.9 AKI (ACUTE KIDNEY INJURY): ICD-10-CM

## 2025-03-26 LAB
ALBUMIN SERPL-MCNC: 3.7 G/DL (ref 3.5–5.2)
ALP SERPL-CCNC: 171 U/L (ref 40–129)
ALT SERPL-CCNC: 28 U/L (ref 0–40)
ANION GAP SERPL CALCULATED.3IONS-SCNC: 12 MMOL/L (ref 7–16)
AST SERPL-CCNC: 34 U/L (ref 0–39)
BASOPHILS # BLD: 0.03 K/UL (ref 0–0.2)
BASOPHILS NFR BLD: 0 % (ref 0–2)
BILIRUB SERPL-MCNC: 0.3 MG/DL (ref 0–1.2)
BUN SERPL-MCNC: 83 MG/DL (ref 6–23)
CALCIUM SERPL-MCNC: 9.2 MG/DL (ref 8.6–10.2)
CHLORIDE SERPL-SCNC: 122 MMOL/L (ref 98–107)
CO2 SERPL-SCNC: 26 MMOL/L (ref 22–29)
CREAT SERPL-MCNC: 2.3 MG/DL (ref 0.7–1.2)
EOSINOPHIL # BLD: 0.04 K/UL (ref 0.05–0.5)
EOSINOPHILS RELATIVE PERCENT: 1 % (ref 0–6)
ERYTHROCYTE [DISTWIDTH] IN BLOOD BY AUTOMATED COUNT: 14.5 % (ref 11.5–15)
GFR, ESTIMATED: 26 ML/MIN/1.73M2
GLUCOSE BLD-MCNC: 275 MG/DL (ref 74–99)
GLUCOSE SERPL-MCNC: 187 MG/DL (ref 74–99)
HCT VFR BLD AUTO: 37.7 % (ref 37–54)
HGB BLD-MCNC: 11.2 G/DL (ref 12.5–16.5)
IMM GRANULOCYTES # BLD AUTO: 0.03 K/UL (ref 0–0.58)
IMM GRANULOCYTES NFR BLD: 0 % (ref 0–5)
LIPASE SERPL-CCNC: 90 U/L (ref 13–60)
LYMPHOCYTES NFR BLD: 1.85 K/UL (ref 1.5–4)
LYMPHOCYTES RELATIVE PERCENT: 23 % (ref 20–42)
MCH RBC QN AUTO: 30.4 PG (ref 26–35)
MCHC RBC AUTO-ENTMCNC: 29.7 G/DL (ref 32–34.5)
MCV RBC AUTO: 102.4 FL (ref 80–99.9)
MONOCYTES NFR BLD: 0.47 K/UL (ref 0.1–0.95)
MONOCYTES NFR BLD: 6 % (ref 2–12)
NEUTROPHILS NFR BLD: 70 % (ref 43–80)
NEUTS SEG NFR BLD: 5.69 K/UL (ref 1.8–7.3)
PLATELET # BLD AUTO: 456 K/UL (ref 130–450)
PMV BLD AUTO: 9.9 FL (ref 7–12)
POTASSIUM SERPL-SCNC: 5.9 MMOL/L (ref 3.5–5)
PROT SERPL-MCNC: 8.5 G/DL (ref 6.4–8.3)
RBC # BLD AUTO: 3.68 M/UL (ref 3.8–5.8)
SODIUM SERPL-SCNC: 160 MMOL/L (ref 132–146)
WBC OTHER # BLD: 8.1 K/UL (ref 4.5–11.5)

## 2025-03-26 PROCEDURE — 99285 EMERGENCY DEPT VISIT HI MDM: CPT

## 2025-03-26 PROCEDURE — 6370000000 HC RX 637 (ALT 250 FOR IP): Performed by: EMERGENCY MEDICINE

## 2025-03-26 PROCEDURE — 2580000003 HC RX 258: Performed by: EMERGENCY MEDICINE

## 2025-03-26 PROCEDURE — 2060000000 HC ICU INTERMEDIATE R&B

## 2025-03-26 PROCEDURE — 82962 GLUCOSE BLOOD TEST: CPT

## 2025-03-26 PROCEDURE — 80053 COMPREHEN METABOLIC PANEL: CPT

## 2025-03-26 PROCEDURE — 6360000002 HC RX W HCPCS: Performed by: EMERGENCY MEDICINE

## 2025-03-26 PROCEDURE — 93005 ELECTROCARDIOGRAM TRACING: CPT | Performed by: EMERGENCY MEDICINE

## 2025-03-26 PROCEDURE — 85025 COMPLETE CBC W/AUTO DIFF WBC: CPT

## 2025-03-26 PROCEDURE — 83690 ASSAY OF LIPASE: CPT

## 2025-03-26 PROCEDURE — 2500000003 HC RX 250 WO HCPCS: Performed by: EMERGENCY MEDICINE

## 2025-03-26 RX ORDER — CALCIUM GLUCONATE 20 MG/ML
1000 INJECTION, SOLUTION INTRAVENOUS ONCE
Status: COMPLETED | OUTPATIENT
Start: 2025-03-26 | End: 2025-03-26

## 2025-03-26 RX ORDER — DEXTROSE MONOHYDRATE 50 MG/ML
INJECTION, SOLUTION INTRAVENOUS CONTINUOUS
Status: DISCONTINUED | OUTPATIENT
Start: 2025-03-26 | End: 2025-03-29

## 2025-03-26 RX ORDER — GLUCAGON 1 MG/ML
1 KIT INJECTION PRN
Status: DISCONTINUED | OUTPATIENT
Start: 2025-03-26 | End: 2025-03-27

## 2025-03-26 RX ORDER — INDOMETHACIN 25 MG/1
50 CAPSULE ORAL ONCE
Status: COMPLETED | OUTPATIENT
Start: 2025-03-26 | End: 2025-03-26

## 2025-03-26 RX ORDER — DEXTROSE MONOHYDRATE 100 MG/ML
INJECTION, SOLUTION INTRAVENOUS CONTINUOUS PRN
Status: DISCONTINUED | OUTPATIENT
Start: 2025-03-26 | End: 2025-03-27 | Stop reason: SDUPTHER

## 2025-03-26 RX ADMIN — SODIUM BICARBONATE 50 MEQ: 84 INJECTION, SOLUTION INTRAVENOUS at 22:01

## 2025-03-26 RX ADMIN — DEXTROSE MONOHYDRATE 250 ML: 100 INJECTION, SOLUTION INTRAVENOUS at 22:14

## 2025-03-26 RX ADMIN — CALCIUM GLUCONATE 1000 MG: 20 INJECTION, SOLUTION INTRAVENOUS at 22:01

## 2025-03-26 RX ADMIN — INSULIN HUMAN 10 UNITS: 100 INJECTION, SOLUTION PARENTERAL at 22:15

## 2025-03-27 PROBLEM — E87.0 HYPERNATREMIA: Status: ACTIVE | Noted: 2025-03-27

## 2025-03-27 LAB
ALBUMIN SERPL-MCNC: 3.3 G/DL (ref 3.5–5.2)
ALP SERPL-CCNC: 165 U/L (ref 40–129)
ALT SERPL-CCNC: 24 U/L (ref 0–40)
ANION GAP SERPL CALCULATED.3IONS-SCNC: 11 MMOL/L (ref 7–16)
ANION GAP SERPL CALCULATED.3IONS-SCNC: 11 MMOL/L (ref 7–16)
ANION GAP SERPL CALCULATED.3IONS-SCNC: 13 MMOL/L (ref 7–16)
ANION GAP SERPL CALCULATED.3IONS-SCNC: 15 MMOL/L (ref 7–16)
ANION GAP SERPL CALCULATED.3IONS-SCNC: 15 MMOL/L (ref 7–16)
AST SERPL-CCNC: 21 U/L (ref 0–39)
BACTERIA URNS QL MICRO: ABNORMAL
BASOPHILS # BLD: 0.03 K/UL (ref 0–0.2)
BASOPHILS NFR BLD: 0 % (ref 0–2)
BILIRUB SERPL-MCNC: 0.4 MG/DL (ref 0–1.2)
BILIRUB UR QL STRIP: NEGATIVE
BUN SERPL-MCNC: 66 MG/DL (ref 6–23)
BUN SERPL-MCNC: 69 MG/DL (ref 6–23)
BUN SERPL-MCNC: 79 MG/DL (ref 6–23)
BUN SERPL-MCNC: 82 MG/DL (ref 6–23)
BUN SERPL-MCNC: 86 MG/DL (ref 6–23)
CALCIUM SERPL-MCNC: 9 MG/DL (ref 8.6–10.2)
CALCIUM SERPL-MCNC: 9.2 MG/DL (ref 8.6–10.2)
CALCIUM SERPL-MCNC: 9.3 MG/DL (ref 8.6–10.2)
CALCIUM SERPL-MCNC: 9.3 MG/DL (ref 8.6–10.2)
CALCIUM SERPL-MCNC: 9.4 MG/DL (ref 8.6–10.2)
CHLORIDE SERPL-SCNC: 106 MMOL/L (ref 98–107)
CHLORIDE SERPL-SCNC: 117 MMOL/L (ref 98–107)
CHLORIDE SERPL-SCNC: 119 MMOL/L (ref 98–107)
CHLORIDE SERPL-SCNC: 119 MMOL/L (ref 98–107)
CHLORIDE SERPL-SCNC: 121 MMOL/L (ref 98–107)
CLARITY UR: CLEAR
CO2 SERPL-SCNC: 22 MMOL/L (ref 22–29)
CO2 SERPL-SCNC: 24 MMOL/L (ref 22–29)
CO2 SERPL-SCNC: 26 MMOL/L (ref 22–29)
COLOR UR: YELLOW
CREAT SERPL-MCNC: 1.9 MG/DL (ref 0.7–1.2)
CREAT SERPL-MCNC: 2 MG/DL (ref 0.7–1.2)
CREAT SERPL-MCNC: 2.2 MG/DL (ref 0.7–1.2)
CREAT SERPL-MCNC: 2.3 MG/DL (ref 0.7–1.2)
CREAT SERPL-MCNC: 2.4 MG/DL (ref 0.7–1.2)
CREAT UR-MCNC: 74.3 MG/DL (ref 40–278)
EOSINOPHIL # BLD: 0.05 K/UL (ref 0.05–0.5)
EOSINOPHILS RELATIVE PERCENT: 1 % (ref 0–6)
EPI CELLS #/AREA URNS HPF: ABNORMAL /HPF
ERYTHROCYTE [DISTWIDTH] IN BLOOD BY AUTOMATED COUNT: 14.4 % (ref 11.5–15)
GFR, ESTIMATED: 25 ML/MIN/1.73M2
GFR, ESTIMATED: 25 ML/MIN/1.73M2
GFR, ESTIMATED: 28 ML/MIN/1.73M2
GFR, ESTIMATED: 31 ML/MIN/1.73M2
GFR, ESTIMATED: 32 ML/MIN/1.73M2
GLUCOSE BLD-MCNC: 122 MG/DL (ref 74–99)
GLUCOSE BLD-MCNC: 242 MG/DL (ref 74–99)
GLUCOSE BLD-MCNC: 65 MG/DL (ref 74–99)
GLUCOSE BLD-MCNC: 82 MG/DL (ref 74–99)
GLUCOSE BLD-MCNC: 87 MG/DL (ref 74–99)
GLUCOSE SERPL-MCNC: 115 MG/DL (ref 74–99)
GLUCOSE SERPL-MCNC: 252 MG/DL (ref 74–99)
GLUCOSE SERPL-MCNC: 301 MG/DL (ref 74–99)
GLUCOSE SERPL-MCNC: 609 MG/DL (ref 74–99)
GLUCOSE SERPL-MCNC: 81 MG/DL (ref 74–99)
GLUCOSE UR STRIP-MCNC: NEGATIVE MG/DL
HCT VFR BLD AUTO: 34.5 % (ref 37–54)
HGB BLD-MCNC: 10.3 G/DL (ref 12.5–16.5)
HGB UR QL STRIP.AUTO: ABNORMAL
IMM GRANULOCYTES # BLD AUTO: 0.04 K/UL (ref 0–0.58)
IMM GRANULOCYTES NFR BLD: 0 % (ref 0–5)
KETONES UR STRIP-MCNC: NEGATIVE MG/DL
LEUKOCYTE ESTERASE UR QL STRIP: ABNORMAL
LYMPHOCYTES NFR BLD: 2.12 K/UL (ref 1.5–4)
LYMPHOCYTES RELATIVE PERCENT: 22 % (ref 20–42)
MAGNESIUM SERPL-MCNC: 3 MG/DL (ref 1.6–2.6)
MCH RBC QN AUTO: 30.5 PG (ref 26–35)
MCHC RBC AUTO-ENTMCNC: 29.9 G/DL (ref 32–34.5)
MCV RBC AUTO: 102.1 FL (ref 80–99.9)
MONOCYTES NFR BLD: 0.64 K/UL (ref 0.1–0.95)
MONOCYTES NFR BLD: 7 % (ref 2–12)
NEUTROPHILS NFR BLD: 70 % (ref 43–80)
NEUTS SEG NFR BLD: 6.63 K/UL (ref 1.8–7.3)
NITRITE UR QL STRIP: NEGATIVE
PH UR STRIP: 6 [PH] (ref 5–8)
PHOSPHATE SERPL-MCNC: 3.4 MG/DL (ref 2.5–4.5)
PLATELET # BLD AUTO: 409 K/UL (ref 130–450)
PMV BLD AUTO: 9.7 FL (ref 7–12)
POTASSIUM SERPL-SCNC: 4.2 MMOL/L (ref 3.5–5)
POTASSIUM SERPL-SCNC: 4.3 MMOL/L (ref 3.5–5)
POTASSIUM SERPL-SCNC: 4.3 MMOL/L (ref 3.5–5)
POTASSIUM SERPL-SCNC: 4.5 MMOL/L (ref 3.5–5)
POTASSIUM SERPL-SCNC: 4.7 MMOL/L (ref 3.5–5)
PROT SERPL-MCNC: 8 G/DL (ref 6.4–8.3)
PROT UR STRIP-MCNC: 30 MG/DL
RBC # BLD AUTO: 3.38 M/UL (ref 3.8–5.8)
RBC #/AREA URNS HPF: ABNORMAL /HPF
SODIUM SERPL-SCNC: 143 MMOL/L (ref 132–146)
SODIUM SERPL-SCNC: 154 MMOL/L (ref 132–146)
SODIUM SERPL-SCNC: 156 MMOL/L (ref 132–146)
SODIUM SERPL-SCNC: 158 MMOL/L (ref 132–146)
SODIUM SERPL-SCNC: 160 MMOL/L (ref 132–146)
SODIUM UR-SCNC: 43 MMOL/L
SP GR UR STRIP: 1.01 (ref 1–1.03)
UROBILINOGEN UR STRIP-ACNC: 0.2 EU/DL (ref 0–1)
UUN UR-MCNC: 989 MG/DL (ref 800–1666)
WBC #/AREA URNS HPF: ABNORMAL /HPF
WBC OTHER # BLD: 9.5 K/UL (ref 4.5–11.5)

## 2025-03-27 PROCEDURE — 2500000003 HC RX 250 WO HCPCS: Performed by: NURSE PRACTITIONER

## 2025-03-27 PROCEDURE — 80053 COMPREHEN METABOLIC PANEL: CPT

## 2025-03-27 PROCEDURE — 84540 ASSAY OF URINE/UREA-N: CPT

## 2025-03-27 PROCEDURE — 81001 URINALYSIS AUTO W/SCOPE: CPT

## 2025-03-27 PROCEDURE — 6360000002 HC RX W HCPCS: Performed by: NURSE PRACTITIONER

## 2025-03-27 PROCEDURE — 2060000000 HC ICU INTERMEDIATE R&B

## 2025-03-27 PROCEDURE — 85025 COMPLETE CBC W/AUTO DIFF WBC: CPT

## 2025-03-27 PROCEDURE — 2580000003 HC RX 258: Performed by: EMERGENCY MEDICINE

## 2025-03-27 PROCEDURE — 2580000003 HC RX 258: Performed by: INTERNAL MEDICINE

## 2025-03-27 PROCEDURE — 84100 ASSAY OF PHOSPHORUS: CPT

## 2025-03-27 PROCEDURE — 82570 ASSAY OF URINE CREATININE: CPT

## 2025-03-27 PROCEDURE — 6370000000 HC RX 637 (ALT 250 FOR IP): Performed by: NURSE PRACTITIONER

## 2025-03-27 PROCEDURE — 6370000000 HC RX 637 (ALT 250 FOR IP): Performed by: INTERNAL MEDICINE

## 2025-03-27 PROCEDURE — 83735 ASSAY OF MAGNESIUM: CPT

## 2025-03-27 PROCEDURE — 82962 GLUCOSE BLOOD TEST: CPT

## 2025-03-27 PROCEDURE — 80048 BASIC METABOLIC PNL TOTAL CA: CPT

## 2025-03-27 PROCEDURE — 84300 ASSAY OF URINE SODIUM: CPT

## 2025-03-27 RX ORDER — ONDANSETRON 2 MG/ML
4 INJECTION INTRAMUSCULAR; INTRAVENOUS EVERY 6 HOURS PRN
Status: DISCONTINUED | OUTPATIENT
Start: 2025-03-27 | End: 2025-03-29

## 2025-03-27 RX ORDER — ERGOCALCIFEROL 1.25 MG/1
50000 CAPSULE, LIQUID FILLED ORAL WEEKLY
Status: DISCONTINUED | OUTPATIENT
Start: 2025-04-01 | End: 2025-04-05 | Stop reason: HOSPADM

## 2025-03-27 RX ORDER — GLUCAGON 1 MG/ML
1 KIT INJECTION PRN
Status: DISCONTINUED | OUTPATIENT
Start: 2025-03-27 | End: 2025-04-05 | Stop reason: HOSPADM

## 2025-03-27 RX ORDER — SODIUM CHLORIDE 9 MG/ML
INJECTION, SOLUTION INTRAVENOUS PRN
Status: DISCONTINUED | OUTPATIENT
Start: 2025-03-27 | End: 2025-04-05 | Stop reason: HOSPADM

## 2025-03-27 RX ORDER — HYDRALAZINE HYDROCHLORIDE 20 MG/ML
10 INJECTION INTRAMUSCULAR; INTRAVENOUS EVERY 6 HOURS PRN
Status: DISCONTINUED | OUTPATIENT
Start: 2025-03-27 | End: 2025-04-05 | Stop reason: HOSPADM

## 2025-03-27 RX ORDER — DEXTROSE MONOHYDRATE 100 MG/ML
INJECTION, SOLUTION INTRAVENOUS CONTINUOUS PRN
Status: DISCONTINUED | OUTPATIENT
Start: 2025-03-27 | End: 2025-03-27 | Stop reason: SDUPTHER

## 2025-03-27 RX ORDER — POLYETHYLENE GLYCOL 3350 17 G/17G
17 POWDER, FOR SOLUTION ORAL DAILY PRN
Status: DISCONTINUED | OUTPATIENT
Start: 2025-03-27 | End: 2025-04-05 | Stop reason: HOSPADM

## 2025-03-27 RX ORDER — SODIUM CHLORIDE 0.9 % (FLUSH) 0.9 %
5-40 SYRINGE (ML) INJECTION PRN
Status: DISCONTINUED | OUTPATIENT
Start: 2025-03-27 | End: 2025-04-05 | Stop reason: HOSPADM

## 2025-03-27 RX ORDER — HEPARIN SODIUM 5000 [USP'U]/ML
5000 INJECTION, SOLUTION INTRAVENOUS; SUBCUTANEOUS 2 TIMES DAILY
Status: DISCONTINUED | OUTPATIENT
Start: 2025-03-27 | End: 2025-04-02 | Stop reason: DRUGHIGH

## 2025-03-27 RX ORDER — SODIUM CHLORIDE 0.9 % (FLUSH) 0.9 %
5-40 SYRINGE (ML) INJECTION EVERY 12 HOURS SCHEDULED
Status: DISCONTINUED | OUTPATIENT
Start: 2025-03-27 | End: 2025-04-05 | Stop reason: HOSPADM

## 2025-03-27 RX ORDER — DEXTROSE MONOHYDRATE 100 MG/ML
INJECTION, SOLUTION INTRAVENOUS CONTINUOUS PRN
Status: DISCONTINUED | OUTPATIENT
Start: 2025-03-27 | End: 2025-04-05 | Stop reason: HOSPADM

## 2025-03-27 RX ORDER — ONDANSETRON 4 MG/1
4 TABLET, ORALLY DISINTEGRATING ORAL EVERY 8 HOURS PRN
Status: DISCONTINUED | OUTPATIENT
Start: 2025-03-27 | End: 2025-03-29

## 2025-03-27 RX ORDER — ERGOCALCIFEROL 1.25 MG/1
50000 CAPSULE, LIQUID FILLED ORAL WEEKLY
COMMUNITY

## 2025-03-27 RX ORDER — MULTIVITAMIN WITH IRON
1 TABLET ORAL DAILY
Status: DISCONTINUED | OUTPATIENT
Start: 2025-03-27 | End: 2025-03-27

## 2025-03-27 RX ORDER — INSULIN LISPRO 100 [IU]/ML
0-8 INJECTION, SOLUTION INTRAVENOUS; SUBCUTANEOUS
Status: DISCONTINUED | OUTPATIENT
Start: 2025-03-27 | End: 2025-03-27

## 2025-03-27 RX ORDER — INSULIN LISPRO 100 [IU]/ML
0-4 INJECTION, SOLUTION INTRAVENOUS; SUBCUTANEOUS
Status: DISCONTINUED | OUTPATIENT
Start: 2025-03-28 | End: 2025-04-05 | Stop reason: HOSPADM

## 2025-03-27 RX ADMIN — CHOLECALCIFEROL TAB 10 MCG (400 UNIT) 400 UNITS: 10 TAB at 09:41

## 2025-03-27 RX ADMIN — DEXTROSE MONOHYDRATE 125 ML: 100 INJECTION, SOLUTION INTRAVENOUS at 20:48

## 2025-03-27 RX ADMIN — DEXTROSE MONOHYDRATE: 50 INJECTION, SOLUTION INTRAVENOUS at 00:37

## 2025-03-27 RX ADMIN — SODIUM CHLORIDE, PRESERVATIVE FREE 10 ML: 5 INJECTION INTRAVENOUS at 09:41

## 2025-03-27 RX ADMIN — INSULIN LISPRO 8 UNITS: 100 INJECTION, SOLUTION INTRAVENOUS; SUBCUTANEOUS at 16:23

## 2025-03-27 RX ADMIN — DEXTROSE MONOHYDRATE: 100 INJECTION, SOLUTION INTRAVENOUS at 20:59

## 2025-03-27 RX ADMIN — HEPARIN SODIUM 5000 UNITS: 5000 INJECTION INTRAVENOUS; SUBCUTANEOUS at 09:41

## 2025-03-27 RX ADMIN — SODIUM CHLORIDE, PRESERVATIVE FREE 10 ML: 5 INJECTION INTRAVENOUS at 19:54

## 2025-03-27 RX ADMIN — DEXTROSE MONOHYDRATE: 50 INJECTION, SOLUTION INTRAVENOUS at 18:49

## 2025-03-27 NOTE — PROGRESS NOTES
Patient has refused breakfast and lunch for RN.  Encouraged to eat, offered to sit up in chair and reposition in bed.  Pt refuses.

## 2025-03-27 NOTE — ED NOTES
ED to Inpatient Handoff Report    Notified floor that electronic handoff available and patient ready for transport to room 441.    Safety Risks: Risk of falls    Patient in Restraints: no    Constant Observer or Patient : yes     Telemetry Monitoring Ordered: No          Order to transfer to unit without monitor: NO    Last MEWS: 0 Time completed: 2252    Deterioration Index: (!) 54.03    Vitals:    03/26/25 2005 03/26/25 2021 03/26/25 2201 03/26/25 2252   BP:   (!) 154/74 (!) 154/74   Pulse:   75 69   Resp:   16 13   Temp:    98.5 °F (36.9 °C)   TempSrc:       SpO2:   100% 100%   Weight: 50.3 kg (111 lb) 50.3 kg (111 lb)     Height: 1.575 m (5' 2\") 1.575 m (5' 2\")         Opportunity for questions and clarification was provided.

## 2025-03-27 NOTE — PROGRESS NOTES
4 Eyes Skin Assessment     NAME:  Paul Pemberton  YOB: 1929  MEDICAL RECORD NUMBER:  [id number]    The patient is being assessed for  Admission    I agree that at least one RN has performed a thorough Head to Toe Skin Assessment on the patient. ALL assessment sites listed below have been assessed.      Areas assessed by both nurses:    Head, Face, Ears, Shoulders, Back, Chest, Arms, Elbows, Hands, Sacrum. Buttock, Coccyx, Ischium, Legs. Feet and Heels, Under Medical Devices , and Other          Does the Patient have a Wound? Yes wound(s) were present on assessment. LDA wound assessment was Initiated and completed by RN       Abdirahman Prevention initiated by RN: Yes  Wound Care Orders initiated by RN: Yes    Pressure Injury (Stage 3,4, Unstageable, DTI, NWPT, and Complex wounds) if present, place Wound referral order by RN under : No    New Ostomies, if present place, Ostomy referral order under : No     Nurse 1 eSignature: Electronically signed by Magalis Ramírez RN on 3/27/25 at 12:49 AM EDT    **SHARE this note so that the co-signing nurse can place an eSignature**    Nurse 2 eSignature: Electronically signed by Tanya Rodriguez RN on 3/27/25 at 12:54 AM EDT

## 2025-03-27 NOTE — CARE COORDINATION
CASE MANAGEMENT.... Patient is a readmission from [facility]. He was previously admitted to [facility] s/p fall. Returns now with hyperkalemia. Verified with [facility] from [facility] that patient can return skilled with plans to transition to long term. Will need PT/OT and precert. However, facility can accept pending. Patient has Alzheimers. Spoke with his son, Paul, and he is agreeable to plans.  Mr Pemberton is being followed by renal. Monitoring labs/vitals and treating accordingly. N 17 in epic. Ambulance forms in chart. No need for [address]. Will follow.

## 2025-03-27 NOTE — DISCHARGE INSTR - COC
Continuity of Care Form    Patient Name: Paul Pemberton   :  1929  MRN:  [id number]    Admit date:  3/26/2025  Discharge date:  2025    Code Status Order: Full Code   Advance Directives:     Admitting Physician:  Jens Cohen MD  PCP: Guillaume Krishna DO    Discharging Nurse: RT  Discharging Hospital Unit/Room#: 0441/0441-A  Discharging Unit Phone Number: [phone]    Emergency Contact:   Extended Emergency Contact Information  Primary Emergency Contact: PAUL PEMBERTON JR   [facility]  Home Phone: [phone]  Mobile Phone: [phone]  Relation: Child  Secondary Emergency Contact: Kaylynn Ha   [facility]  Home Phone: [phone]  Relation: Grandchild    Past Surgical History:  Past Surgical History:   Procedure Laterality Date    URETHRAL STRICTURE DILATATION         Immunization History:   Immunization History   Administered Date(s) Administered    COVID-19, MODERNA BLUE border, Primary or Immunocompromised, (age 12y+), IM, 100 mcg/0.5mL 2021, 2021    COVID-19, PFIZER GRAY top, DO NOT Dilute, (age 12 y+), IM, 30 mcg/0.3 mL 2022    Influenza Virus Vaccine 2017    Influenza, FLUAD, (age 65 y+), IM, Quadv, 0.5mL 2020, 2021    Influenza, FLUAD, (age 65 y+), IM, Trivalent PF, 0.5mL 2019    Influenza, FLUZONE High Dose (age 65 y+), IM, Quadv, 0.7mL 2022    Influenza, FLUZONE High Dose, (age 65 y+), IM, Trivalent PF, 0.5mL 2016, 2018    Pneumococcal, PCV-13, PREVNAR 13, (age 6w+), IM, 0.5mL 2016    Pneumococcal, PPSV23, PNEUMOVAX 23, (age 2y+), SC/IM, 0.5mL 2021       Active Problems:  Patient Active Problem List   Diagnosis Code    Type II diabetes mellitus (HCC) E11.9    Urethral stricture N35.919    Stage 3b chronic kidney disease (HCC) N18.32    Intrinsic eczema L20.84    Severe protein-calorie malnutrition E43    Alzheimer's disease with late onset (HCC) G30.1, F02.80    UTI (urinary tract infection)

## 2025-03-27 NOTE — H&P
[facility]   History and Physical      CHIEF COMPLAINT:  elevated sodium and potassium.    Reason for Admission:  REGINALDO, hyperkalemia and high sodium    History Obtained From:  patient, electronic medical record    HISTORY OF PRESENT ILLNESS:      The patient is a 89 y.o. male of Guillaume Krishna DO with significant past medical history of dementia, DM2, HTN who presents with elevated potassium and sodium.    Sent from NH due to abnormal labs.  Sodium 158  Decreased oral intake for few days. Patient is poor historian.  Potassium 5.8 - given ca gluconate, insulin, and bicarb.  Repeat 4.7.    Started on IV fluids.    No complaints. No appetite. Refusing to eat.    All labs personally reviewed   All imaging personally reviewed     Past Medical History:        Diagnosis Date    Allergic rhinitis     Diabetic neuropathy (HCC)     Hyperlipidemia     Hypertension     Osteoarthritis     Type 2 diabetes mellitus without complication (HCC)      Past Surgical History:        Procedure Laterality Date    URETHRAL STRICTURE DILATATION           Medications Prior to Admission:    Medications Prior to Admission: vitamin D (ERGOCALCIFEROL) 1.25 MG (20264 UT) CAPS capsule, Take 1 capsule by mouth once a week Every Tuesday give for low vitamin D  Cholecalciferol (VITAMIN D) 10 MCG (400 UNIT) CAPS Capsule, Take 1 capsule by mouth daily  Multiple Vitamin (MULTIVITAMIN) TABS tablet, Take 1 tablet by mouth daily (Patient not taking: Reported on 3/27/2025)  bismuth subsalicylate (PEPTO BISMOL) 262 MG/15ML suspension, Take 30 mLs by mouth every 6 hours as needed for Indigestion or Other (Patient not taking: Reported on 3/27/2025)    Allergies:  Patient has no known allergies.    Social History:   TOBACCO:   reports that he has never smoked. He has never used smokeless tobacco.  ETOH:   reports no history of alcohol use.  MARITAL STATUS:    OCCUPATION:      Family History:       Family history unknown: Yes       REVIEW OF

## 2025-03-27 NOTE — CARE COORDINATION
Advance Care Planning   Healthcare Decision Maker:    Primary Decision Maker: RADHAMES PULIDO JR - Child - [phone]    Secondary Decision Maker: Kaylynn Ha - Grandchild - [phone]

## 2025-03-27 NOTE — PROGRESS NOTES
Right buttock wound was previously dry.  Now has small amount of serosanguinous drainage.  Optiview ordered and applied for prevention of worsening.  Wound care nurse consulted

## 2025-03-27 NOTE — ED PROVIDER NOTES
[facility] EMERGENCY DEPARTMENT  EMERGENCY DEPARTMENT ENCOUNTER        Pt Name: Paul Pemberton  MRN: [id number]  Birthdate 9/5/1929  Date of evaluation: 3/26/2025  Provider: Jim Ogden DO  PCP: Guillaume Krishna DO  Note Started: 8:44 PM EDT 3/26/25    CHIEF COMPLAINT       Chief Complaint   Patient presents with    OTHER     ABNORMAL LABS- SODIUM 158       HISTORY OF PRESENT ILLNESS: 1 or more Elements   History From: Patient    Limitations to history : None    Paul Pemberton is a 89 y.o. male who presents to the Emergency Department for abnormal labs.  The patient was sent in from his facility as they did outpatient labs and his sodium was elevated.  Upon arrival to the ED the patient has no acute complaints.  Denies any abdominal pain.  No chest pain no shortness of breath.  No nausea vomiting diarrhea.  Per EMS and nursing facility patient has had poor oral intake lately.    Nursing Notes were all reviewed and agreed with or any disagreements were addressed in the HPI.      REVIEW OF EXTERNAL NOTE :       PDMP Monitoring:    Last PDMP Alvaro as Reviewed:  Review User Review Instant Review Result            Urine Drug Screenings (1 yr)    No resulted procedures found.       Medication Contract and Consent for Opioid Use Documents Filed        No documents found                      REVIEW OF SYSTEMS :           Positives and Pertinent negatives as per HPI.     SURGICAL HISTORY     Past Surgical History:   Procedure Laterality Date    URETHRAL STRICTURE DILATATION         CURRENTMEDICATIONS       Previous Medications    BISMUTH SUBSALICYLATE (PEPTO BISMOL) 262 MG/15ML SUSPENSION    Take 30 mLs by mouth every 6 hours as needed for Indigestion or Other    MULTIPLE VITAMIN (MULTIVITAMIN) TABS TABLET    Take 1 tablet by mouth daily       ALLERGIES     Patient has no known allergies.    FAMILYHISTORY     No family history on file.     SOCIAL HISTORY       Social History     Tobacco

## 2025-03-28 LAB
ANION GAP SERPL CALCULATED.3IONS-SCNC: 10 MMOL/L (ref 7–16)
ANION GAP SERPL CALCULATED.3IONS-SCNC: 13 MMOL/L (ref 7–16)
ANION GAP SERPL CALCULATED.3IONS-SCNC: 13 MMOL/L (ref 7–16)
ANION GAP SERPL CALCULATED.3IONS-SCNC: 8 MMOL/L (ref 7–16)
ANION GAP SERPL CALCULATED.3IONS-SCNC: 9 MMOL/L (ref 7–16)
BASOPHILS # BLD: 0.02 K/UL (ref 0–0.2)
BASOPHILS NFR BLD: 0 % (ref 0–2)
BUN SERPL-MCNC: 49 MG/DL (ref 6–23)
BUN SERPL-MCNC: 53 MG/DL (ref 6–23)
BUN SERPL-MCNC: 55 MG/DL (ref 6–23)
BUN SERPL-MCNC: 62 MG/DL (ref 6–23)
BUN SERPL-MCNC: 66 MG/DL (ref 6–23)
CALCIUM SERPL-MCNC: 8.8 MG/DL (ref 8.6–10.2)
CALCIUM SERPL-MCNC: 8.8 MG/DL (ref 8.6–10.2)
CALCIUM SERPL-MCNC: 8.9 MG/DL (ref 8.6–10.2)
CALCIUM SERPL-MCNC: 9.1 MG/DL (ref 8.6–10.2)
CALCIUM SERPL-MCNC: 9.4 MG/DL (ref 8.6–10.2)
CHLORIDE SERPL-SCNC: 106 MMOL/L (ref 98–107)
CHLORIDE SERPL-SCNC: 109 MMOL/L (ref 98–107)
CHLORIDE SERPL-SCNC: 110 MMOL/L (ref 98–107)
CHLORIDE SERPL-SCNC: 112 MMOL/L (ref 98–107)
CHLORIDE SERPL-SCNC: 113 MMOL/L (ref 98–107)
CO2 SERPL-SCNC: 22 MMOL/L (ref 22–29)
CO2 SERPL-SCNC: 24 MMOL/L (ref 22–29)
CO2 SERPL-SCNC: 24 MMOL/L (ref 22–29)
CO2 SERPL-SCNC: 25 MMOL/L (ref 22–29)
CO2 SERPL-SCNC: 27 MMOL/L (ref 22–29)
CREAT SERPL-MCNC: 1.7 MG/DL (ref 0.7–1.2)
CREAT SERPL-MCNC: 1.8 MG/DL (ref 0.7–1.2)
CREAT SERPL-MCNC: 1.8 MG/DL (ref 0.7–1.2)
CREAT SERPL-MCNC: 1.9 MG/DL (ref 0.7–1.2)
CREAT SERPL-MCNC: 2 MG/DL (ref 0.7–1.2)
EKG ATRIAL RATE: 74 BPM
EKG P AXIS: 110 DEGREES
EKG P-R INTERVAL: 230 MS
EKG Q-T INTERVAL: 414 MS
EKG QRS DURATION: 134 MS
EKG QTC CALCULATION (BAZETT): 459 MS
EKG R AXIS: -57 DEGREES
EKG T AXIS: 54 DEGREES
EKG VENTRICULAR RATE: 74 BPM
EOSINOPHIL # BLD: 0.14 K/UL (ref 0.05–0.5)
EOSINOPHILS RELATIVE PERCENT: 2 % (ref 0–6)
ERYTHROCYTE [DISTWIDTH] IN BLOOD BY AUTOMATED COUNT: 14.2 % (ref 11.5–15)
GFR, ESTIMATED: 30 ML/MIN/1.73M2
GFR, ESTIMATED: 33 ML/MIN/1.73M2
GFR, ESTIMATED: 34 ML/MIN/1.73M2
GFR, ESTIMATED: 36 ML/MIN/1.73M2
GFR, ESTIMATED: 37 ML/MIN/1.73M2
GLUCOSE BLD-MCNC: 133 MG/DL (ref 74–99)
GLUCOSE BLD-MCNC: 135 MG/DL (ref 74–99)
GLUCOSE BLD-MCNC: 173 MG/DL (ref 74–99)
GLUCOSE BLD-MCNC: 201 MG/DL (ref 74–99)
GLUCOSE BLD-MCNC: 203 MG/DL (ref 74–99)
GLUCOSE BLD-MCNC: 203 MG/DL (ref 74–99)
GLUCOSE SERPL-MCNC: 132 MG/DL (ref 74–99)
GLUCOSE SERPL-MCNC: 169 MG/DL (ref 74–99)
GLUCOSE SERPL-MCNC: 194 MG/DL (ref 74–99)
GLUCOSE SERPL-MCNC: 198 MG/DL (ref 74–99)
GLUCOSE SERPL-MCNC: 232 MG/DL (ref 74–99)
HCT VFR BLD AUTO: 31.6 % (ref 37–54)
HGB BLD-MCNC: 9.5 G/DL (ref 12.5–16.5)
IMM GRANULOCYTES # BLD AUTO: 0.03 K/UL (ref 0–0.58)
IMM GRANULOCYTES NFR BLD: 0 % (ref 0–5)
LYMPHOCYTES NFR BLD: 1.44 K/UL (ref 1.5–4)
LYMPHOCYTES RELATIVE PERCENT: 16 % (ref 20–42)
MAGNESIUM SERPL-MCNC: 2.6 MG/DL (ref 1.6–2.6)
MCH RBC QN AUTO: 30.2 PG (ref 26–35)
MCHC RBC AUTO-ENTMCNC: 30.1 G/DL (ref 32–34.5)
MCV RBC AUTO: 100.3 FL (ref 80–99.9)
MONOCYTES NFR BLD: 0.41 K/UL (ref 0.1–0.95)
MONOCYTES NFR BLD: 5 % (ref 2–12)
NEUTROPHILS NFR BLD: 77 % (ref 43–80)
NEUTS SEG NFR BLD: 6.73 K/UL (ref 1.8–7.3)
PHOSPHATE SERPL-MCNC: 3.3 MG/DL (ref 2.5–4.5)
PLATELET # BLD AUTO: 356 K/UL (ref 130–450)
PMV BLD AUTO: 9.9 FL (ref 7–12)
POTASSIUM SERPL-SCNC: 4.1 MMOL/L (ref 3.5–5)
POTASSIUM SERPL-SCNC: 4.4 MMOL/L (ref 3.5–5)
POTASSIUM SERPL-SCNC: 4.5 MMOL/L (ref 3.5–5)
POTASSIUM SERPL-SCNC: 4.5 MMOL/L (ref 3.5–5)
POTASSIUM SERPL-SCNC: 4.6 MMOL/L (ref 3.5–5)
RBC # BLD AUTO: 3.15 M/UL (ref 3.8–5.8)
SODIUM SERPL-SCNC: 141 MMOL/L (ref 132–146)
SODIUM SERPL-SCNC: 143 MMOL/L (ref 132–146)
SODIUM SERPL-SCNC: 143 MMOL/L (ref 132–146)
SODIUM SERPL-SCNC: 147 MMOL/L (ref 132–146)
SODIUM SERPL-SCNC: 151 MMOL/L (ref 132–146)
WBC OTHER # BLD: 8.8 K/UL (ref 4.5–11.5)

## 2025-03-28 PROCEDURE — 6370000000 HC RX 637 (ALT 250 FOR IP): Performed by: NURSE PRACTITIONER

## 2025-03-28 PROCEDURE — 2580000003 HC RX 258: Performed by: EMERGENCY MEDICINE

## 2025-03-28 PROCEDURE — 83735 ASSAY OF MAGNESIUM: CPT

## 2025-03-28 PROCEDURE — 2060000000 HC ICU INTERMEDIATE R&B

## 2025-03-28 PROCEDURE — 87077 CULTURE AEROBIC IDENTIFY: CPT

## 2025-03-28 PROCEDURE — 6360000002 HC RX W HCPCS: Performed by: NURSE PRACTITIONER

## 2025-03-28 PROCEDURE — 82962 GLUCOSE BLOOD TEST: CPT

## 2025-03-28 PROCEDURE — 93010 ELECTROCARDIOGRAM REPORT: CPT | Performed by: INTERNAL MEDICINE

## 2025-03-28 PROCEDURE — 87205 SMEAR GRAM STAIN: CPT

## 2025-03-28 PROCEDURE — 84100 ASSAY OF PHOSPHORUS: CPT

## 2025-03-28 PROCEDURE — 97165 OT EVAL LOW COMPLEX 30 MIN: CPT

## 2025-03-28 PROCEDURE — 6370000000 HC RX 637 (ALT 250 FOR IP): Performed by: INTERNAL MEDICINE

## 2025-03-28 PROCEDURE — 80048 BASIC METABOLIC PNL TOTAL CA: CPT

## 2025-03-28 PROCEDURE — 87070 CULTURE OTHR SPECIMN AEROBIC: CPT

## 2025-03-28 PROCEDURE — 97161 PT EVAL LOW COMPLEX 20 MIN: CPT

## 2025-03-28 PROCEDURE — 2580000003 HC RX 258: Performed by: INTERNAL MEDICINE

## 2025-03-28 PROCEDURE — 85025 COMPLETE CBC W/AUTO DIFF WBC: CPT

## 2025-03-28 PROCEDURE — 36415 COLL VENOUS BLD VENIPUNCTURE: CPT

## 2025-03-28 RX ORDER — DRONABINOL 2.5 MG/1
2.5 CAPSULE ORAL 2 TIMES DAILY
Status: DISCONTINUED | OUTPATIENT
Start: 2025-03-28 | End: 2025-04-05 | Stop reason: HOSPADM

## 2025-03-28 RX ADMIN — DEXTROSE MONOHYDRATE: 50 INJECTION, SOLUTION INTRAVENOUS at 13:05

## 2025-03-28 RX ADMIN — DEXTROSE MONOHYDRATE: 50 INJECTION, SOLUTION INTRAVENOUS at 05:37

## 2025-03-28 RX ADMIN — HEPARIN SODIUM 5000 UNITS: 5000 INJECTION INTRAVENOUS; SUBCUTANEOUS at 09:12

## 2025-03-28 RX ADMIN — PETROLATUM: 420 OINTMENT TOPICAL at 16:30

## 2025-03-28 RX ADMIN — DRONABINOL 2.5 MG: 2.5 CAPSULE ORAL at 12:44

## 2025-03-28 RX ADMIN — CHOLECALCIFEROL TAB 10 MCG (400 UNIT) 400 UNITS: 10 TAB at 09:12

## 2025-03-28 RX ADMIN — DEXTROSE MONOHYDRATE: 50 INJECTION, SOLUTION INTRAVENOUS at 20:22

## 2025-03-28 RX ADMIN — HEPARIN SODIUM 5000 UNITS: 5000 INJECTION INTRAVENOUS; SUBCUTANEOUS at 20:15

## 2025-03-28 RX ADMIN — INSULIN LISPRO 1 UNITS: 100 INJECTION, SOLUTION INTRAVENOUS; SUBCUTANEOUS at 11:48

## 2025-03-28 RX ADMIN — DRONABINOL 2.5 MG: 2.5 CAPSULE ORAL at 20:15

## 2025-03-28 ASSESSMENT — PAIN SCALES - GENERAL: PAINLEVEL_OUTOF10: 0

## 2025-03-28 NOTE — PROGRESS NOTES
standardized testing/informal observation of tasks, assessment of data and education on plan of care and goals.      CPT codes:  [x] Low Complexity PT evaluation 82224  [] Moderate Complexity PT evaluation 13256  [] High Complexity PT evaluation 70753  [] PT Re-evaluation 42053  [] Gait training 03860 minutes  [] Manual therapy 78432 minutes  [] Therapeutic activities 88507 minutes  [] Therapeutic exercises 56327 minutes  [] Neuromuscular reeducation 87964 minutes     Trista Brar PT [id number]

## 2025-03-28 NOTE — PROGRESS NOTES
Spoke to Suyapa Mendoza NP about patients low blood sugar. If blood sugar normal after D10 bolus, okay to start D5 continuous at 125mL/hr again.

## 2025-03-28 NOTE — FLOWSHEET NOTE
Inpatient Wound Care (initial consult) 441    Admit Date: 3/26/2025  7:52 PM    Reason for consult:  right buttock/posterior thigh    Patient laying down in bed, awake, alert and disoriented. Assist of two people to roll, dependent.     Significant history:  per H&P    CHIEF COMPLAINT:  elevated sodium and potassium.     Reason for Admission:  REGINALDO, hyperkalemia and high sodium     History Obtained From:  patient, electronic medical record     HISTORY OF PRESENT ILLNESS:       The patient is a 89 y.o. male of Guillaume Krishna DO with significant past medical history of dementia, DM2, HTN who presents with elevated potassium and sodium.    Past Medical History:   Diagnosis Date    Allergic rhinitis     Diabetic neuropathy (HCC)     Hyperlipidemia     Hypertension     Osteoarthritis     Type 2 diabetes mellitus without complication (HCC)      Findings:     03/28/25 1142   Skin Integrity Site 2   Skin Integrity Location 2 Other (comment)  (intact blanching, dry flaky)   Skin Integrity Site 3   Skin Integrity Location 3 Other (comment)  (old healing scars without pigmentation)    Location 3 left buttock   Wound 03/27/25 Hip Posterior;Right   Date First Assessed/Time First Assessed: 03/27/25 0015   Present on Original Admission: Yes  Primary Wound Type: Pressure Injury  Location: Hip  Wound Location Orientation: Posterior;Right   Wound Image    Wound Etiology Pressure Stage 3   Dressing Status New dressing applied   Wound Cleansed Cleansed with saline   Dressing/Treatment Foam  (Opticell,)   Wound Length (cm) 1.7 cm   Wound Width (cm) 1.5 cm   Wound Depth (cm) 0.1 cm   Wound Surface Area (cm^2) 2.55 cm^2   Change in Wound Size % (l*w) 36.25   Wound Volume (cm^3) 0.255 cm^3   Wound Healing % -538   Wound Assessment [place]/red;Slough   Drainage Amount Small (< 25%)   Drainage Description Serosanguinous   Odor None   Jaimie-wound Assessment Intact       **Informed Consent**    Photos taken of wounds and inserted into their

## 2025-03-28 NOTE — PROGRESS NOTES
Occupational Therapy  OCCUPATIONAL THERAPY INITIAL EVALUATION  [facility]  [address], [place]    Date: 3/28/2025     Patient Name: Paul Pemberton  MRN: [id number]  : 1929  Room: [address] [place] [address]    Evaluating OT: Tanya Portillo, OTR/L - [id number]    Referring Provider: Jens Cohen MD  Specific Provider Orders/Date: \"OT eval and treat\" - 3/27/2025    Diagnosis: Hyperkalemia [E87.5]  Hypernatremia [E87.0]  REGINALDO (acute kidney injury) [N17.9]     Pertinent Medical History: dementia, DM, OA, HTN, neuropathy     Precautions: fall risk, skin integrity    Assessment of Current Deficits:    [x] Functional mobility   [x] ADLs  [x] Strength               [x] Cognition   [x] Functional transfers   [x] IADLs         [x] Safety Awareness   [x] Endurance   [] Fine Motor Coordination  [x] Balance      [] Vision/Perception   [x] Sensation    [] Gross Motor Coordination [] ROM          [] Delirium                  [] Motor Control     OT PLAN OF CARE   OT POC is based on physician orders, patient diagnosis, and results of clinical assessment.  Frequency/Duration 1-3 days/week for 2-4 weeks PRN   Specific OT Treatment Interventions to Include:   * Instruction/training on adapted ADL techniques and AE recommendations to increase functional independence within precautions       * Training on energy conservation strategies, correct breathing pattern and techniques to improve independence/tolerance for self-care routine  * Functional transfer/mobility training/DME recommendations for increased independence, safety, and fall prevention  * Patient/Family education to increase follow through with safety techniques and functional independence  * Recommendation of environmental modifications for increased safety with functional transfers/mobility and ADLs  * Cognitive retraining/development of therapeutic activities to improve problem solving, judgement, memory, and  15 1   OT Eval Medium 33780     OT Eval High 35623     OT Re-Eval 52321     Therapeutic Ex 09779     Therapeutic Activities 80738     ADL/Self Care 57967     Orthotic Management 59847     Neuro Re-Ed 45984     Non-Billable Time N/A ---     Evaluation time includes thorough review of current medical information, gathering information on past medical history/social history and prior level of function, completion of standardized testing/informal observation of tasks, assessment of data, and education on plan of care and goals.    Tanya Portillo, OTR/L  License Number: [id number]

## 2025-03-28 NOTE — PROGRESS NOTES
[facility]   Progress note      Subjective:    F/u high sodium    The patient is awake and alert.  Memory and alertness improving.  Some issues with glucose - glucose 600 and given SSI, but repeat 65. Possible accucheck high reading caused by hand with D5 running.  Denies chest pain, angina, SOB.   Still no appetite.    Objective:    BP (!) 146/53   Pulse 64   Temp 97.3 °F (36.3 °C) (Oral)   Resp 18   Ht 1.575 m (5' 2\")   Wt 49.4 kg (109 lb)   SpO2 98%   BMI 19.94 kg/m²     No intake/output data recorded.  No intake/output data recorded.    Physical Exam  Vitals reviewed.   Constitutional:       Appearance: Normal appearance.      Comments: Very fail. Low weight   HENT:      Head: Normocephalic and atraumatic.      Mouth/Throat:      Mouth: Mucous membranes are moist.   Cardiovascular:      Rate and Rhythm: Normal rate and regular rhythm.      Pulses: Normal pulses.      Heart sounds: No murmur heard.  Pulmonary:      Effort: Pulmonary effort is normal. No respiratory distress.      Breath sounds: Normal breath sounds. No wheezing or rales.   Abdominal:      General: Abdomen is flat. Bowel sounds are normal. There is no distension.      Palpations: Abdomen is soft.      Tenderness: There is no abdominal tenderness. There is no guarding.   Musculoskeletal:      Right lower leg: No edema.      Left lower leg: No edema.   Skin:     General: Skin is warm.      Findings: No rash.   Neurological:      General: No focal deficit present.      Mental Status: He is alert. Mental status is at baseline.      Comments: Oriented x2   Psychiatric:         Mood and Affect: Mood normal.         Behavior: Behavior normal.         Thought Content: Thought content normal.            Recent Labs     03/26/25 2008 03/27/25  0122 03/28/25  0500   WBC 8.1 9.5 8.8   HGB 11.2* 10.3* 9.5*   HCT 37.7 34.5* 31.6*   * 409 356       Recent Labs     03/27/25  1844 03/27/25  2355 03/28/25  0500   * 151* 147*

## 2025-03-28 NOTE — PROGRESS NOTES
Attempted to call Dr. Cohen about patients blood sugar. Made Dr. Winston aware of blood sugar drop after insulin given earlier when lab work was showing blood glucose of 609.     2100 Per Dr. Winston continue D10 continuous until primary calls back

## 2025-03-28 NOTE — CARE COORDINATION
CASE MANAGEMENT.....Patient can return skilled to [facility] with plans to transition to long term. Will need precert. However, facility can accept pending. PT/OT saw this am-await input. Monitor labs/vitals. Had hypoglycemia this am. Now on iv D5  gtt. Renal following. N 17 in epic. Ambulance forms in chart. No need for [address]. Will follow.

## 2025-03-29 PROBLEM — E43 SEVERE PROTEIN-CALORIE MALNUTRITION: Chronic | Status: ACTIVE | Noted: 2025-03-29

## 2025-03-29 LAB
ANION GAP SERPL CALCULATED.3IONS-SCNC: 8 MMOL/L (ref 7–16)
BASOPHILS # BLD: 0.02 K/UL (ref 0–0.2)
BASOPHILS NFR BLD: 0 % (ref 0–2)
BUN SERPL-MCNC: 42 MG/DL (ref 6–23)
CALCIUM SERPL-MCNC: 8.9 MG/DL (ref 8.6–10.2)
CHLORIDE SERPL-SCNC: 104 MMOL/L (ref 98–107)
CO2 SERPL-SCNC: 24 MMOL/L (ref 22–29)
CREAT SERPL-MCNC: 1.6 MG/DL (ref 0.7–1.2)
EOSINOPHIL # BLD: 0.16 K/UL (ref 0.05–0.5)
EOSINOPHILS RELATIVE PERCENT: 2 % (ref 0–6)
ERYTHROCYTE [DISTWIDTH] IN BLOOD BY AUTOMATED COUNT: 13.9 % (ref 11.5–15)
GFR, ESTIMATED: 40 ML/MIN/1.73M2
GLUCOSE BLD-MCNC: 101 MG/DL (ref 74–99)
GLUCOSE BLD-MCNC: 121 MG/DL (ref 74–99)
GLUCOSE BLD-MCNC: 165 MG/DL (ref 74–99)
GLUCOSE BLD-MCNC: 95 MG/DL (ref 74–99)
GLUCOSE SERPL-MCNC: 114 MG/DL (ref 74–99)
HCT VFR BLD AUTO: 35.3 % (ref 37–54)
HGB BLD-MCNC: 10.7 G/DL (ref 12.5–16.5)
IMM GRANULOCYTES # BLD AUTO: 0.04 K/UL (ref 0–0.58)
IMM GRANULOCYTES NFR BLD: 0 % (ref 0–5)
LYMPHOCYTES NFR BLD: 2.42 K/UL (ref 1.5–4)
LYMPHOCYTES RELATIVE PERCENT: 27 % (ref 20–42)
MAGNESIUM SERPL-MCNC: 2.3 MG/DL (ref 1.6–2.6)
MCH RBC QN AUTO: 30.1 PG (ref 26–35)
MCHC RBC AUTO-ENTMCNC: 30.3 G/DL (ref 32–34.5)
MCV RBC AUTO: 99.2 FL (ref 80–99.9)
MICROORGANISM SPEC CULT: ABNORMAL
MICROORGANISM/AGENT SPEC: ABNORMAL
MONOCYTES NFR BLD: 0.41 K/UL (ref 0.1–0.95)
MONOCYTES NFR BLD: 5 % (ref 2–12)
NEUTROPHILS NFR BLD: 66 % (ref 43–80)
NEUTS SEG NFR BLD: 6.03 K/UL (ref 1.8–7.3)
PHOSPHATE SERPL-MCNC: 3.3 MG/DL (ref 2.5–4.5)
PLATELET # BLD AUTO: 373 K/UL (ref 130–450)
PMV BLD AUTO: 9.8 FL (ref 7–12)
POTASSIUM SERPL-SCNC: 4.5 MMOL/L (ref 3.5–5)
RBC # BLD AUTO: 3.56 M/UL (ref 3.8–5.8)
SERVICE CMNT-IMP: ABNORMAL
SODIUM SERPL-SCNC: 136 MMOL/L (ref 132–146)
SPECIMEN DESCRIPTION: ABNORMAL
WBC OTHER # BLD: 9.1 K/UL (ref 4.5–11.5)

## 2025-03-29 PROCEDURE — 6370000000 HC RX 637 (ALT 250 FOR IP): Performed by: INTERNAL MEDICINE

## 2025-03-29 PROCEDURE — 84100 ASSAY OF PHOSPHORUS: CPT

## 2025-03-29 PROCEDURE — 2500000003 HC RX 250 WO HCPCS: Performed by: NURSE PRACTITIONER

## 2025-03-29 PROCEDURE — 80048 BASIC METABOLIC PNL TOTAL CA: CPT

## 2025-03-29 PROCEDURE — 6360000002 HC RX W HCPCS: Performed by: NURSE PRACTITIONER

## 2025-03-29 PROCEDURE — 85025 COMPLETE CBC W/AUTO DIFF WBC: CPT

## 2025-03-29 PROCEDURE — 6370000000 HC RX 637 (ALT 250 FOR IP): Performed by: NURSE PRACTITIONER

## 2025-03-29 PROCEDURE — 82962 GLUCOSE BLOOD TEST: CPT

## 2025-03-29 PROCEDURE — 83735 ASSAY OF MAGNESIUM: CPT

## 2025-03-29 PROCEDURE — 2060000000 HC ICU INTERMEDIATE R&B

## 2025-03-29 RX ORDER — PROCHLORPERAZINE EDISYLATE 5 MG/ML
5 INJECTION INTRAMUSCULAR; INTRAVENOUS EVERY 6 HOURS PRN
Status: DISCONTINUED | OUTPATIENT
Start: 2025-03-29 | End: 2025-04-05 | Stop reason: HOSPADM

## 2025-03-29 RX ORDER — HYDROXYZINE HYDROCHLORIDE 50 MG/ML
25 INJECTION, SOLUTION INTRAMUSCULAR EVERY 6 HOURS PRN
Status: DISCONTINUED | OUTPATIENT
Start: 2025-03-29 | End: 2025-03-30

## 2025-03-29 RX ADMIN — DRONABINOL 2.5 MG: 2.5 CAPSULE ORAL at 21:37

## 2025-03-29 RX ADMIN — DRONABINOL 2.5 MG: 2.5 CAPSULE ORAL at 07:59

## 2025-03-29 RX ADMIN — HEPARIN SODIUM 5000 UNITS: 5000 INJECTION INTRAVENOUS; SUBCUTANEOUS at 21:37

## 2025-03-29 RX ADMIN — SODIUM CHLORIDE, PRESERVATIVE FREE 10 ML: 5 INJECTION INTRAVENOUS at 21:38

## 2025-03-29 RX ADMIN — CHOLECALCIFEROL TAB 10 MCG (400 UNIT) 400 UNITS: 10 TAB at 07:59

## 2025-03-29 RX ADMIN — HYDROXYZINE HYDROCHLORIDE 25 MG: 50 INJECTION, SOLUTION INTRAMUSCULAR at 23:51

## 2025-03-29 RX ADMIN — PETROLATUM: 420 OINTMENT TOPICAL at 17:03

## 2025-03-29 RX ADMIN — PETROLATUM: 420 OINTMENT TOPICAL at 07:58

## 2025-03-29 RX ADMIN — HEPARIN SODIUM 5000 UNITS: 5000 INJECTION INTRAVENOUS; SUBCUTANEOUS at 07:59

## 2025-03-29 ASSESSMENT — PAIN SCALES - GENERAL
PAINLEVEL_OUTOF10: 0

## 2025-03-29 NOTE — PROGRESS NOTES
104   CO2 24 27 24   BUN 53* 49* 42*   CREATININE 1.8* 1.7* 1.6*   CALCIUM 8.9 8.8 8.9       Assessment:    Principal Problem:    REGINALDO (acute kidney injury)  Active Problems:    Type II diabetes mellitus (HCC)    Severe protein-calorie malnutrition    FTT (failure to thrive) in adult    Hypernatremia  Resolved Problems:    * No resolved hospital problems. *      Plan:    REGINALDO - secondary to dehydration.  FENa 0.9 consistent with pre-renal.  Not eating/drinking, no interest.  Nephro following.   Avoid nephrotoxic meds.  2.4, improved some 2.2. baseline 1.4.  Cont IV fluids.     Hyperkalemia - from REGINALDO. Resolved quickly. Monitor.  Hypernatriemia - from decreased oral intake.  160. Slowly improving on D5 IV fluids. Now 154. Might need to lower rate or change fluid so not correct too fast.     DM2 - glucose 200-300. On D5 drip.  Start SSI and monitor.     Dementia and FTT  Not oriented. No interest in food/drinking.  Cont IV fluids.  See if correcting lytes will improve appetite.  Might be progression of dementia.   May try appetite stimulating meds. If fails to improve, will need to discuss goals of care with family - hospice or PEG tube.     3/28/25  Sodium improving to 147 from 160 over 24 hrs. Reduce D5 rate from 125 to 75.  Cont to monitor sodium.  Mentation seems better.  Glucose 190-200.  On low SSI. Monitor with D5 change rate.  Creat improved some from 2.4 to 1.9. baseline 1.4. monitor.  Still no appetite. Trial of dronabinol BID.  If not taking anything by mouth will need to discuss goals of care with family.    3/29/25  Sodium back at baseline 136.  IV fluids stopped overnight.  Creat back close to baseline 1.6 (baseline 1.4).  Has appetite on dronabinol.  Will continue to monitor. If labs stable and eating, can likely d/c to SNF in 1-2 days.  PT score 7/24, severely deconditioned.         DVT prophylaxis - heparin TID  PT OT - working with them.  Discharge plan back to [facility]. Possible  long-term    Jens Cohen MD  11:04 AM  3/29/2025

## 2025-03-29 NOTE — PROGRESS NOTES
Comprehensive Nutrition Assessment    Type and Reason for Visit:  Initial, Consult, Wound    Nutrition Recommendations/Plan:     Recommend consider Liberalize current diet to No Added Salt to promote improved intake, as K+ WNL now  Continue Wound Healing ONS BID, Diabetic ONS BID  Will continue inpatient monitoring POC     Malnutrition Assessment:  Malnutrition Status:  Severe malnutrition (03/29/25 1442)    Context:  Chronic Illness     Findings of the 6 clinical characteristics of malnutrition:  Energy Intake:  75% or less estimated energy requirements for 1 month or longer  Weight Loss:  Mild weight loss (10% over 1 year)     Body Fat Loss:  Severe body fat loss Triceps, Orbital   Muscle Mass Loss:  Severe muscle mass loss Clavicles (pectoralis & deltoids), Temples (temporalis)  Fluid Accumulation:  Unable to assess Extremities (multiple factors)   Strength:  Not Performed    Nutrition Assessment:    Pt admit from NH 2/2 REGINALDO s/p poor PO PTA per NH staff. PMHx=CKD, DM, dementia. Pt started on appetite stimulant now with some improvement observed today. Will add Wound Healing ONS BID and Diabetic ONS to optimize nutrient intake. Pt meets criteria for PCM.    Nutrition Related Findings:    A&Ox1, K+ WNL now, decreased appetite, +2 edema, Missing teeth, +I/O 4.9L, abd +BS Wound Type: Pressure Injury, Stage III       Current Nutrition Intake & Therapies:    Average Meal Intake: 1-25%  Average Supplements Intake: None Ordered  ADULT DIET; Regular; 4 carb choices (60 gm/meal); Low Fat/Low Chol/High Fiber/2 gm Na; Low Potassium (Less than 3000 mg/day)  ADULT ORAL NUTRITION SUPPLEMENT; Breakfast, Dinner; Wound Healing Oral Supplement  ADULT ORAL NUTRITION SUPPLEMENT; Lunch, Dinner; Diabetic Oral Supplement    Anthropometric Measures:  Height: 157.5 cm (5' 2\")  Ideal Body Weight (IBW): 118 lbs (54 kg)    Admission Body Weight: 49.4 kg (108 lb 14.5 oz) (3/28 bedscale)  Current Body Weight: 47.9 kg (105 lb 9.6 oz), 89.5  % IBW. Weight Source: Bed scale (3/29)  Current BMI (kg/m2): 19.3  Usual Body Weight: 53.3 kg (117 lb 8.1 oz) (at PCP annual physical 2/2024)     % Weight Change (Calculated): -10.1                    BMI Categories: Underweight (BMI less than 22) age over 65    Estimated Daily Nutrient Needs:  Energy Requirements Based On: Formula ([place] Jeor)  Weight Used for Energy Requirements: Current  Energy (kcal/day):   Weight Used for Protein Requirements: Current  Protein (g/day): 50-60 (1.1-1.2 g/kg)  Method Used for Fluid Requirements: Defer to provider      Nutrition Diagnosis:   Severe malnutrition, in context of chronic illness related to inadequate protein-energy intake as evidenced by criteria as identified in malnutrition assessment    Nutrition Interventions:   Food and/or Nutrient Delivery: Continue Current Diet, Start Oral Nutrition Supplement  Nutrition Education/Counseling: Education/Counseling not indicated  Coordination of Nutrition Care: Continue to monitor while inpatient       Goals:  Goals: PO intake 50% or greater, by next RD assessment  Type of Goal: New goal       Nutrition Monitoring and Evaluation:   Behavioral-Environmental Outcomes: None Identified  Food/Nutrient Intake Outcomes: Food and Nutrient Intake, Supplement Intake  Physical Signs/Symptoms Outcomes: Biochemical Data, GI Status, Fluid Status or Edema, Nutrition Focused Physical Findings, Skin, Weight    Discharge Planning:    Continue Oral Nutrition Supplement     Annette Banda RD, CNSC, LD  Contact: x [phone]

## 2025-03-29 NOTE — PLAN OF CARE
Problem: Chronic Conditions and Co-morbidities  Goal: Patient's chronic conditions and co-morbidity symptoms are monitored and maintained or improved  3/29/2025 1631 by Norah Davey RN  Outcome: Progressing     Problem: Discharge Planning  Goal: Discharge to home or other facility with appropriate resources  3/29/2025 1631 by Norah Davey RN  Outcome: Progressing     Problem: Safety - Adult  Goal: Free from fall injury  3/29/2025 1631 by Norah Davey RN  Outcome: Progressing     Problem: Skin/Tissue Integrity  Goal: Skin integrity remains intact  Description: 1.  Monitor for areas of redness and/or skin breakdown  2.  Assess vascular access sites hourly  3.  Every 4-6 hours minimum:  Change oxygen saturation probe site  4.  Every 4-6 hours:  If on nasal continuous positive airway pressure, respiratory therapy assess nares and determine need for appliance change or resting period  3/29/2025 1631 by Norah Davey RN  Outcome: Progressing     Problem: ABCDS Injury Assessment  Goal: Absence of physical injury  3/29/2025 1631 by Norah Davey RN  Outcome: Progressing     Problem: Pain  Goal: Verbalizes/displays adequate comfort level or baseline comfort level  3/29/2025 1631 by Norah Davey RN  Outcome: Progressing     Problem: Nutrition Deficit:  Goal: Optimize nutritional status  3/29/2025 1631 by Norah Davey RN  Outcome: Progressing

## 2025-03-30 LAB
ANION GAP SERPL CALCULATED.3IONS-SCNC: 9 MMOL/L (ref 7–16)
BUN SERPL-MCNC: 34 MG/DL (ref 6–23)
CALCIUM SERPL-MCNC: 8.9 MG/DL (ref 8.6–10.2)
CHLORIDE SERPL-SCNC: 109 MMOL/L (ref 98–107)
CO2 SERPL-SCNC: 24 MMOL/L (ref 22–29)
CREAT SERPL-MCNC: 1.7 MG/DL (ref 0.7–1.2)
GFR, ESTIMATED: 38 ML/MIN/1.73M2
GLUCOSE BLD-MCNC: 110 MG/DL (ref 74–99)
GLUCOSE BLD-MCNC: 64 MG/DL (ref 74–99)
GLUCOSE BLD-MCNC: 74 MG/DL (ref 74–99)
GLUCOSE BLD-MCNC: 86 MG/DL (ref 74–99)
GLUCOSE BLD-MCNC: 88 MG/DL (ref 74–99)
GLUCOSE BLD-MCNC: 95 MG/DL (ref 74–99)
GLUCOSE SERPL-MCNC: 95 MG/DL (ref 74–99)
MAGNESIUM SERPL-MCNC: 2.4 MG/DL (ref 1.6–2.6)
PHOSPHATE SERPL-MCNC: 3.7 MG/DL (ref 2.5–4.5)
POTASSIUM SERPL-SCNC: 4.7 MMOL/L (ref 3.5–5)
SODIUM SERPL-SCNC: 142 MMOL/L (ref 132–146)

## 2025-03-30 PROCEDURE — 2500000003 HC RX 250 WO HCPCS: Performed by: NURSE PRACTITIONER

## 2025-03-30 PROCEDURE — 82962 GLUCOSE BLOOD TEST: CPT

## 2025-03-30 PROCEDURE — 36415 COLL VENOUS BLD VENIPUNCTURE: CPT

## 2025-03-30 PROCEDURE — 84100 ASSAY OF PHOSPHORUS: CPT

## 2025-03-30 PROCEDURE — 83735 ASSAY OF MAGNESIUM: CPT

## 2025-03-30 PROCEDURE — 80048 BASIC METABOLIC PNL TOTAL CA: CPT

## 2025-03-30 PROCEDURE — 2060000000 HC ICU INTERMEDIATE R&B

## 2025-03-30 PROCEDURE — 6360000002 HC RX W HCPCS: Performed by: NURSE PRACTITIONER

## 2025-03-30 PROCEDURE — 2580000003 HC RX 258: Performed by: INTERNAL MEDICINE

## 2025-03-30 RX ORDER — LORAZEPAM 2 MG/ML
0.5 INJECTION INTRAMUSCULAR EVERY 6 HOURS PRN
Status: DISCONTINUED | OUTPATIENT
Start: 2025-03-30 | End: 2025-04-05 | Stop reason: HOSPADM

## 2025-03-30 RX ORDER — DEXTROSE MONOHYDRATE AND SODIUM CHLORIDE 5; .9 G/100ML; G/100ML
INJECTION, SOLUTION INTRAVENOUS CONTINUOUS
Status: DISCONTINUED | OUTPATIENT
Start: 2025-03-30 | End: 2025-03-31

## 2025-03-30 RX ADMIN — PETROLATUM: 420 OINTMENT TOPICAL at 16:32

## 2025-03-30 RX ADMIN — DEXTROSE AND SODIUM CHLORIDE: 5; .9 INJECTION, SOLUTION INTRAVENOUS at 10:36

## 2025-03-30 RX ADMIN — PETROLATUM: 420 OINTMENT TOPICAL at 08:14

## 2025-03-30 RX ADMIN — LORAZEPAM 0.5 MG: 2 INJECTION INTRAMUSCULAR; INTRAVENOUS at 01:52

## 2025-03-30 RX ADMIN — DEXTROSE MONOHYDRATE 125 ML: 100 INJECTION, SOLUTION INTRAVENOUS at 06:00

## 2025-03-30 RX ADMIN — SODIUM CHLORIDE, PRESERVATIVE FREE 10 ML: 5 INJECTION INTRAVENOUS at 08:14

## 2025-03-30 NOTE — PROGRESS NOTES
Nephrology Progress Note      Reason for Consult: Hyponatremia and REGINALDO  Date of Service: 3/30/2025   Chief Complaint: had concerns including OTHER (ABNORMAL LABS- SODIUM 158).  History Obtained From: patient electronic medical record     History of Presenting illness:   Paul Pemberton is a 89 y.o. male with a past medical history of diabetes hypertension and dementia.     They presented on 3/26/2025 complaining of abnormal labs.  He presented with decreased oral intake for several days.  He is a poor historian and provides conflicting information.  He was subsequently admitted for a potassium of 5.9, sodium 160 and a creatinine of 2.3.  Nephrology was consulted for hyperkalemia, hyponatremia and REGINALDO.    Interval history  3/30/25:No new issues. Sleeping but wakes.    Past Medical History:   Past Medical History:   Diagnosis Date    Allergic rhinitis     Diabetic neuropathy (HCC)     Hyperlipidemia     Hypertension     Osteoarthritis     Type 2 diabetes mellitus without complication (HCC)        Past Surgical History:   Past Surgical History:   Procedure Laterality Date    URETHRAL STRICTURE DILATATION         Medications Prior to Admission:   Medications Prior to Admission: vitamin D (ERGOCALCIFEROL) 1.25 MG (46954 UT) CAPS capsule, Take 1 capsule by mouth once a week Every Tuesday give for low vitamin D  Cholecalciferol (VITAMIN D) 10 MCG (400 UNIT) CAPS Capsule, Take 1 capsule by mouth daily  Multiple Vitamin (MULTIVITAMIN) TABS tablet, Take 1 tablet by mouth daily (Patient not taking: Reported on 3/27/2025)  bismuth subsalicylate (PEPTO BISMOL) 262 MG/15ML suspension, Take 30 mLs by mouth every 6 hours as needed for Indigestion or Other (Patient not taking: Reported on 3/27/2025)    Allergies:   Patient has no known allergies.    Social History:    reports that he has never smoked. He has never used smokeless tobacco. He reports that he does not drink alcohol.    Family History:   Family history is unknown by  daily    Hypernatremia  Hypernatremia likely secondary to decreased free water intake  Initial sodium of 160 with progressive correction.  Did drop down to 143 within the first 24 hours but with a blood glucose of 600 correcting to around 153  Subsequently sodium improved to 147 then 136> 142    Hyperkalemia: Resolved  Hyperkalemia in the setting of acute renal injury and poor oral intake  Last potassium 4.7 mmol/L  Monitor    Electrolytes   Last sodium 142 mmol/L.  Last Magnesium 2.4 mg/dL.  Last Calcium 8.9 mg/dL with an albumin of 3.3 g/dL.  Last phosphorus 3.7  mg/dL.  Plan   Monitor electrolytes.    Acid base  Last bicarbonate level 24 mmol/L, Chloride 109 mmol/dL, AG 9 mmol/L today. Their last albumin was 3.3 g/dL.  Plan  Monitor      Thank you for allowing us to participate in the care of Paul Nilay Merino MD  1:35 PM  3/30/2025

## 2025-03-30 NOTE — PROGRESS NOTES
Notified April NP that the patients agitation, combativeness and safety concerns still remain an issue. New orders received.

## 2025-03-30 NOTE — PROGRESS NOTES
[facility]   Progress note      Subjective:    The patient is sleepy but arousable.  Overnight, patient became agitated and combative overnight.  Given dose IV visteril with no improvement.  IV ativan helped.   This morning, glucose 64 - improved to 88 after glucose bolus, now 74.  Not eating today.    Objective:    BP (!) 152/80   Pulse 70   Temp 97.8 °F (36.6 °C) (Oral)   Resp 20   Ht 1.575 m (5' 2\")   Wt 45.7 kg (100 lb 12 oz)   SpO2 94%   BMI 18.43 kg/m²     In: -   Out: 950   In: -   Out: 950 [Urine:950]    Physical Exam  Vitals reviewed.   Constitutional:       General: He is not in acute distress.     Appearance: Normal appearance.      Comments: Very thin and frail   HENT:      Head: Normocephalic and atraumatic.      Mouth/Throat:      Mouth: Mucous membranes are moist.   Cardiovascular:      Rate and Rhythm: Normal rate and regular rhythm.      Pulses: Normal pulses.      Heart sounds: No murmur heard.  Pulmonary:      Effort: No respiratory distress.      Breath sounds: Normal breath sounds. No wheezing or rales.   Abdominal:      General: Abdomen is flat. Bowel sounds are normal. There is no distension.      Palpations: Abdomen is soft.      Tenderness: There is no abdominal tenderness. There is no guarding.   Musculoskeletal:      Right lower leg: No edema.      Left lower leg: No edema.   Neurological:      General: No focal deficit present.      Mental Status: He is alert.      Comments: Oriented x1   Psychiatric:         Mood and Affect: Mood normal.            Recent Labs     03/28/25  0500 03/29/25  0415   WBC 8.8 9.1   HGB 9.5* 10.7*   HCT 31.6* 35.3*    373       Recent Labs     03/28/25  1905 03/29/25  0415 03/30/25  0710    136 142   K 4.5 4.5 4.7    104 109*   CO2 27 24 24   BUN 49* 42* 34*   CREATININE 1.7* 1.6* 1.7*   CALCIUM 8.8 8.9 8.9       Assessment:    Principal Problem:    REGINALDO (acute kidney injury)  Active Problems:    Type II diabetes  deconditioning - will need SNF.  If poor eating, etc, may to to discuss with family about PEG tube.     Code - full  Consults - nephrology  DVT prophylaxis - heparin TID  PT OT - working with them.score 7/24  Discharge plan back to [facility]. Possible long-term     Jens Cohen MD  11:22 AM  3/30/2025

## 2025-03-30 NOTE — PROGRESS NOTES
Nephrology Progress Note      Reason for Consult: Hyponatremia and REGINALDO  Date of Service: 3/31/2025   Chief Complaint: had concerns including OTHER (ABNORMAL LABS- SODIUM 158).  History Obtained From: patient electronic medical record     History of Presenting illness:   From the 3/30/25 note of Dr. Merino: Paul Pemberton is a 89 y.o. male with a past medical history of diabetes hypertension and dementia.     They presented on 3/26/2025 complaining of abnormal labs.  He presented with decreased oral intake for several days.  He is a poor historian and provides conflicting information.  He was subsequently admitted for a potassium of 5.9, sodium 160 and a creatinine of 2.3.  Nephrology was consulted for hyperkalemia, hyponatremia and REGINALDO.    Interval history  3/31/25: Pt awake alert and states he cannot eat as his teeth are not good and hecannot chew well    Past Medical History:   Past Medical History:   Diagnosis Date    Allergic rhinitis     Diabetic neuropathy (HCC)     Hyperlipidemia     Hypertension     Osteoarthritis     Type 2 diabetes mellitus without complication (HCC)        Past Surgical History:   Past Surgical History:   Procedure Laterality Date    URETHRAL STRICTURE DILATATION         Medications Prior to Admission:   Medications Prior to Admission: vitamin D (ERGOCALCIFEROL) 1.25 MG (00462 UT) CAPS capsule, Take 1 capsule by mouth once a week Every Tuesday give for low vitamin D  Cholecalciferol (VITAMIN D) 10 MCG (400 UNIT) CAPS Capsule, Take 1 capsule by mouth daily  Multiple Vitamin (MULTIVITAMIN) TABS tablet, Take 1 tablet by mouth daily (Patient not taking: Reported on 3/27/2025)  bismuth subsalicylate (PEPTO BISMOL) 262 MG/15ML suspension, Take 30 mLs by mouth every 6 hours as needed for Indigestion or Other (Patient not taking: Reported on 3/27/2025)    Allergies:   Patient has no known allergies.    Social History:    reports that he has never smoked. He has never used smokeless tobacco. He

## 2025-03-31 PROBLEM — N39.0 UTI (URINARY TRACT INFECTION): Status: RESOLVED | Noted: 2025-03-01 | Resolved: 2025-03-31
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
## 2025-03-31 LAB
ANION GAP SERPL CALCULATED.3IONS-SCNC: 8 MMOL/L (ref 7–16)
BUN SERPL-MCNC: 31 MG/DL (ref 6–23)
CALCIUM SERPL-MCNC: 8.8 MG/DL (ref 8.6–10.2)
CHLORIDE SERPL-SCNC: 114 MMOL/L (ref 98–107)
CO2 SERPL-SCNC: 24 MMOL/L (ref 22–29)
CREAT SERPL-MCNC: 1.7 MG/DL (ref 0.7–1.2)
FERRITIN SERPL-MCNC: 109 NG/ML
FOLATE SERPL-MCNC: 7.7 NG/ML (ref 4.8–24.2)
GFR, ESTIMATED: 37 ML/MIN/1.73M2
GLUCOSE BLD-MCNC: 103 MG/DL (ref 74–99)
GLUCOSE BLD-MCNC: 105 MG/DL (ref 74–99)
GLUCOSE BLD-MCNC: 151 MG/DL (ref 74–99)
GLUCOSE BLD-MCNC: 82 MG/DL (ref 74–99)
GLUCOSE SERPL-MCNC: 82 MG/DL (ref 74–99)
IRON SATN MFR SERPL: 8 % (ref 20–55)
IRON SERPL-MCNC: 22 UG/DL (ref 59–158)
MAGNESIUM SERPL-MCNC: 2.2 MG/DL (ref 1.6–2.6)
PHOSPHATE SERPL-MCNC: 3.5 MG/DL (ref 2.5–4.5)
POTASSIUM SERPL-SCNC: 4.6 MMOL/L (ref 3.5–5)
SODIUM SERPL-SCNC: 146 MMOL/L (ref 132–146)
TIBC SERPL-MCNC: 272 UG/DL (ref 250–450)
VIT B12 SERPL-MCNC: 659 PG/ML (ref 211–946)

## 2025-03-31 PROCEDURE — 6360000002 HC RX W HCPCS: Performed by: INTERNAL MEDICINE

## 2025-03-31 PROCEDURE — 6360000002 HC RX W HCPCS: Performed by: NURSE PRACTITIONER

## 2025-03-31 PROCEDURE — 2060000000 HC ICU INTERMEDIATE R&B

## 2025-03-31 PROCEDURE — 82746 ASSAY OF FOLIC ACID SERUM: CPT

## 2025-03-31 PROCEDURE — 83550 IRON BINDING TEST: CPT

## 2025-03-31 PROCEDURE — 82962 GLUCOSE BLOOD TEST: CPT

## 2025-03-31 PROCEDURE — 83735 ASSAY OF MAGNESIUM: CPT

## 2025-03-31 PROCEDURE — 84100 ASSAY OF PHOSPHORUS: CPT

## 2025-03-31 PROCEDURE — 2580000003 HC RX 258: Performed by: INTERNAL MEDICINE

## 2025-03-31 PROCEDURE — 80048 BASIC METABOLIC PNL TOTAL CA: CPT

## 2025-03-31 PROCEDURE — 82607 VITAMIN B-12: CPT

## 2025-03-31 PROCEDURE — 6370000000 HC RX 637 (ALT 250 FOR IP): Performed by: INTERNAL MEDICINE

## 2025-03-31 PROCEDURE — 83540 ASSAY OF IRON: CPT

## 2025-03-31 PROCEDURE — 82728 ASSAY OF FERRITIN: CPT

## 2025-03-31 PROCEDURE — 36415 COLL VENOUS BLD VENIPUNCTURE: CPT

## 2025-03-31 PROCEDURE — 6370000000 HC RX 637 (ALT 250 FOR IP): Performed by: NURSE PRACTITIONER

## 2025-03-31 RX ORDER — DEXTROSE AND SODIUM CHLORIDE 5; .2 G/100ML; G/100ML
INJECTION, SOLUTION INTRAVENOUS CONTINUOUS
Status: DISCONTINUED | OUTPATIENT
Start: 2025-03-31 | End: 2025-04-01

## 2025-03-31 RX ORDER — DEXTROSE MONOHYDRATE AND SODIUM CHLORIDE 5; .45 G/100ML; G/100ML
INJECTION, SOLUTION INTRAVENOUS CONTINUOUS
Status: DISCONTINUED | OUTPATIENT
Start: 2025-03-31 | End: 2025-03-31

## 2025-03-31 RX ADMIN — CHOLECALCIFEROL TAB 10 MCG (400 UNIT) 400 UNITS: 10 TAB at 07:12

## 2025-03-31 RX ADMIN — DRONABINOL 2.5 MG: 2.5 CAPSULE ORAL at 20:18

## 2025-03-31 RX ADMIN — HEPARIN SODIUM 5000 UNITS: 5000 INJECTION INTRAVENOUS; SUBCUTANEOUS at 20:17

## 2025-03-31 RX ADMIN — HEPARIN SODIUM 5000 UNITS: 5000 INJECTION INTRAVENOUS; SUBCUTANEOUS at 07:13

## 2025-03-31 RX ADMIN — PETROLATUM: 420 OINTMENT TOPICAL at 07:12

## 2025-03-31 RX ADMIN — SODIUM CHLORIDE 125 MG: 9 INJECTION, SOLUTION INTRAVENOUS at 14:36

## 2025-03-31 RX ADMIN — DEXTROSE AND SODIUM CHLORIDE: 5; 450 INJECTION, SOLUTION INTRAVENOUS at 11:18

## 2025-03-31 RX ADMIN — PETROLATUM: 420 OINTMENT TOPICAL at 15:18

## 2025-03-31 RX ADMIN — LORAZEPAM 0.5 MG: 2 INJECTION INTRAMUSCULAR; INTRAVENOUS at 20:18

## 2025-03-31 RX ADMIN — DRONABINOL 2.5 MG: 2.5 CAPSULE ORAL at 07:12

## 2025-03-31 RX ADMIN — DEXTROSE AND SODIUM CHLORIDE: 5; 200 INJECTION, SOLUTION INTRAVENOUS at 14:30

## 2025-03-31 ASSESSMENT — PAIN SCALES - GENERAL
PAINLEVEL_OUTOF10: 0
PAINLEVEL_OUTOF10: 0

## 2025-03-31 NOTE — CARE COORDINATION
CASE MANAGEMENT..... Patient having poor po intake despite receiving marinol bid. Per pcp, he may require peg tube. On ivf. Continue to monitor labs/vitals. Renal following. He will return to [place]. [address] in [facility]. Ambulance forms in chart. [place] need [place] [id number]. Will follow.

## 2025-03-31 NOTE — PROGRESS NOTES
mellitus (HCC)    Severe protein-calorie malnutrition    FTT (failure to thrive) in adult    Hypernatremia  Resolved Problems:    * No resolved hospital problems. *      Plan:    REGINALDO - secondary to dehydration.  FENa 0.9 consistent with pre-renal.  Not eating/drinking, no interest.  Nephro following.   Avoid nephrotoxic meds.  2.4, improved some 2.2. baseline 1.4.  Cont IV fluids.     Hyperkalemia - from REGINALDO. Resolved quickly. Monitor.  Hypernatriemia - from decreased oral intake.  160. Slowly improving on D5 IV fluids. Now 154. Might need to lower rate or change fluid so not correct too fast.     DM2 - glucose 200-300. On D5 drip.  Start SSI and monitor.     Dementia and FTT  Not oriented. No interest in food/drinking.  Cont IV fluids.  See if correcting lytes will improve appetite.  Might be progression of dementia.   May try appetite stimulating meds. If fails to improve, will need to discuss goals of care with family - hospice or PEG tube.     3/28/25  Sodium improving to 147 from 160 over 24 hrs. Reduce D5 rate from 125 to 75.  Cont to monitor sodium.  Mentation seems better.  Glucose 190-200.  On low SSI. Monitor with D5 change rate.  Creat improved some from 2.4 to 1.9. baseline 1.4. monitor.  Still no appetite. Trial of dronabinol BID.  If not taking anything by mouth will need to discuss goals of care with family.     3/29/25  Sodium back at baseline 136.  IV fluids stopped overnight.  Creat back close to baseline 1.6 (baseline 1.4).  Has appetite on dronabinol.  Will continue to monitor. If labs stable and eating, can likely d/c to SNF in 1-2 days.  PT score 7/24, severely deconditioned.      3/30/25  Sodium 142. Creat 1.7 (baseline 1.4).  Had some improvement on dronabinol with appetite.  Confused and agitated overnight. Will cont to monitor. Ativan PRN.  Glucose low this AM off D5.  64. Suspect poor glucose stores from poor diet.  Restart D5 NS at 75 mL/hr. Monitor glucose and mentation.  Severely  deconditioning - will need SNF.  If poor eating, etc, may to to discuss with family about PEG tube.    3/31/25  On IV fluids due to hypoglycemia off IV fluid.  Sodium trending up 142 to 146. Change from D5 NS to D5 1/2NS, decrease rate from 75 to 50 mL/hr to see if can wean.  Glucose .  Mentation stable. Likely poor stores.  Encourage eating. Reports has appetite.  Monitor glucose.   Severely deconditioning - will need SNF.  Labs showing low iron but normal ferritin.  Cont dronabinol.  If unable to wean IV fluids or appetite still poor, will need to discuss options with family.       Code - full  Consults - nephrology  DVT prophylaxis - heparin TID  PT OT - working with them.score 7/24  Discharge plan back to [facility] SNF. Possible long-term     Jens Cohen MD  10:53 AM  3/31/2025

## 2025-03-31 NOTE — PROGRESS NOTES
Spiritual Health History and Assessment/Progress Note  [facility]     ,  ,  ,   received information from neto Coats that Patient was a spiritual consult who wanted to fill out ACP docs.  asked Patient about filling out ACP docs and Patient was unclear as to what the paperwork was about.  explained. Patient did not indicate that he was wanting to complete docs. Patient also stated he was not in contact with his Son or Daughter; that he did not have their phone numbers.  provided Patient phone numbers of Son and Granddaughter, as listed in Patient's chart. Patient then stated he would call them at a later time.  also asked Patient if there was someone within his [employer] whom he would like to make medical decisions for him, if he was unconscious. Patient stated he was not close to anyone. When  asked Patient if he wanted CPR or a ventilator, Patient stated, \"The Doctors can do whatever they feel best.\"  did not help Patient complete ACP docs.   Name: Paul Pemberton MRN: [id number]    Age: 89 y.o.     Sex: male   Language: English   Mandaen: [employer]   REGINALDO (acute kidney injury)     Date: 3/31/2025                           Spiritual Assessment began in [facility] INTERMEDIATE 1                    Debbie, Belief, Meaning:   Patient identifies as spiritual, is connected with a dbebie tradition or spiritual practice, and has beliefs or practices that help with coping during difficult times  Family/Friends No family/friends present      Importance and Influence:  Patient has spiritual/personal beliefs that influence decisions regarding their health  Family/Friends No family/friends present    Community:  Patient is connected with a spiritual community and expresses feelings of isolation: disconnected from family/friends  Family/Friends No family/friends present    Assessment and Plan of Care:     Patient Interventions include: Facilitated  expression of thoughts and feelings, Affirmed coping skills/support systems, and Provided sacramental/Voodoo ritual  Family/Friends Interventions include: No family/friends present    Patient Plan of Care: Spiritual Care available upon further referral  Family/Friends Plan of Care: Spiritual Care available upon further referral    Electronically signed by RUDOLPH Bunn on 3/31/2025 at 11:34 AM

## 2025-04-01 LAB
ANION GAP SERPL CALCULATED.3IONS-SCNC: 11 MMOL/L (ref 7–16)
BUN SERPL-MCNC: 38 MG/DL (ref 6–23)
CALCIUM SERPL-MCNC: 8.8 MG/DL (ref 8.6–10.2)
CHLORIDE SERPL-SCNC: 111 MMOL/L (ref 98–107)
CO2 SERPL-SCNC: 18 MMOL/L (ref 22–29)
CREAT SERPL-MCNC: 1.7 MG/DL (ref 0.7–1.2)
GFR, ESTIMATED: 37 ML/MIN/1.73M2
GLUCOSE BLD-MCNC: 115 MG/DL (ref 74–99)
GLUCOSE BLD-MCNC: 117 MG/DL (ref 74–99)
GLUCOSE BLD-MCNC: 118 MG/DL (ref 74–99)
GLUCOSE BLD-MCNC: 225 MG/DL (ref 74–99)
GLUCOSE SERPL-MCNC: 94 MG/DL (ref 74–99)
MAGNESIUM SERPL-MCNC: 2.1 MG/DL (ref 1.6–2.6)
PHOSPHATE SERPL-MCNC: 3.1 MG/DL (ref 2.5–4.5)
POTASSIUM SERPL-SCNC: 4.2 MMOL/L (ref 3.5–5)
SODIUM SERPL-SCNC: 140 MMOL/L (ref 132–146)

## 2025-04-01 PROCEDURE — 2060000000 HC ICU INTERMEDIATE R&B

## 2025-04-01 PROCEDURE — 6360000002 HC RX W HCPCS: Performed by: INTERNAL MEDICINE

## 2025-04-01 PROCEDURE — 6370000000 HC RX 637 (ALT 250 FOR IP): Performed by: INTERNAL MEDICINE

## 2025-04-01 PROCEDURE — 2500000003 HC RX 250 WO HCPCS: Performed by: NURSE PRACTITIONER

## 2025-04-01 PROCEDURE — 82962 GLUCOSE BLOOD TEST: CPT

## 2025-04-01 PROCEDURE — 2500000003 HC RX 250 WO HCPCS: Performed by: INTERNAL MEDICINE

## 2025-04-01 PROCEDURE — 83735 ASSAY OF MAGNESIUM: CPT

## 2025-04-01 PROCEDURE — 84100 ASSAY OF PHOSPHORUS: CPT

## 2025-04-01 PROCEDURE — 80048 BASIC METABOLIC PNL TOTAL CA: CPT

## 2025-04-01 PROCEDURE — 6360000002 HC RX W HCPCS: Performed by: NURSE PRACTITIONER

## 2025-04-01 PROCEDURE — 6370000000 HC RX 637 (ALT 250 FOR IP): Performed by: NURSE PRACTITIONER

## 2025-04-01 PROCEDURE — 36415 COLL VENOUS BLD VENIPUNCTURE: CPT

## 2025-04-01 PROCEDURE — 2580000003 HC RX 258: Performed by: INTERNAL MEDICINE

## 2025-04-01 RX ADMIN — DRONABINOL 2.5 MG: 2.5 CAPSULE ORAL at 19:26

## 2025-04-01 RX ADMIN — PETROLATUM: 420 OINTMENT TOPICAL at 07:41

## 2025-04-01 RX ADMIN — LORAZEPAM 0.5 MG: 2 INJECTION INTRAMUSCULAR; INTRAVENOUS at 22:54

## 2025-04-01 RX ADMIN — Medication: at 14:52

## 2025-04-01 RX ADMIN — SODIUM CHLORIDE, PRESERVATIVE FREE 10 ML: 5 INJECTION INTRAVENOUS at 07:41

## 2025-04-01 RX ADMIN — DRONABINOL 2.5 MG: 2.5 CAPSULE ORAL at 07:41

## 2025-04-01 RX ADMIN — PETROLATUM: 420 OINTMENT TOPICAL at 19:27

## 2025-04-01 RX ADMIN — CHOLECALCIFEROL TAB 10 MCG (400 UNIT) 400 UNITS: 10 TAB at 07:41

## 2025-04-01 RX ADMIN — SODIUM CHLORIDE 125 MG: 9 INJECTION, SOLUTION INTRAVENOUS at 07:49

## 2025-04-01 RX ADMIN — HEPARIN SODIUM 5000 UNITS: 5000 INJECTION INTRAVENOUS; SUBCUTANEOUS at 19:27

## 2025-04-01 RX ADMIN — HEPARIN SODIUM 5000 UNITS: 5000 INJECTION INTRAVENOUS; SUBCUTANEOUS at 07:41

## 2025-04-01 RX ADMIN — INSULIN LISPRO 1 UNITS: 100 INJECTION, SOLUTION INTRAVENOUS; SUBCUTANEOUS at 19:34

## 2025-04-01 RX ADMIN — ERGOCALCIFEROL 50000 UNITS: 1.25 CAPSULE ORAL at 07:41

## 2025-04-01 RX ADMIN — LORAZEPAM 0.5 MG: 2 INJECTION INTRAMUSCULAR; INTRAVENOUS at 06:41

## 2025-04-01 ASSESSMENT — PAIN SCALES - GENERAL
PAINLEVEL_OUTOF10: 0

## 2025-04-01 NOTE — PLAN OF CARE
Problem: Chronic Conditions and Co-morbidities  Goal: Patient's chronic conditions and co-morbidity symptoms are monitored and maintained or improved  4/1/2025 1207 by Nae Quintanilla RN  Outcome: Progressing     Problem: Discharge Planning  Goal: Discharge to home or other facility with appropriate resources  4/1/2025 1207 by Nae Quintanilla RN  Outcome: Progressing     Problem: Safety - Adult  Goal: Free from fall injury  4/1/2025 1207 by Nae Quintanilla RN  Outcome: Progressing     Problem: Skin/Tissue Integrity  Goal: Skin integrity remains intact  Description: 1.  Monitor for areas of redness and/or skin breakdown  2.  Assess vascular access sites hourly  3.  Every 4-6 hours minimum:  Change oxygen saturation probe site  4.  Every 4-6 hours:  If on nasal continuous positive airway pressure, respiratory therapy assess nares and determine need for appliance change or resting period  4/1/2025 1207 by Nae Quintanilla RN  Outcome: Progressing     Problem: ABCDS Injury Assessment  Goal: Absence of physical injury  4/1/2025 1207 by Nae Quintanilla RN  Outcome: Progressing     Problem: Pain  Goal: Verbalizes/displays adequate comfort level or baseline comfort level  4/1/2025 1207 by Nae Quintanilla RN  Outcome: Progressing     Problem: Nutrition Deficit:  Goal: Optimize nutritional status  4/1/2025 1207 by Nae Quintanilla RN  Outcome: Progressing

## 2025-04-01 NOTE — PROGRESS NOTES
alcohol.    Family History:   Family history is unknown by patient.    REVIEW OF SYSTEMS  Minimum of 12 system ROS was performed as able. Pertinent positives are noted.      Objective:   PHYSICAL EXAM:    Vitals:  BP (!) 112/52   Pulse 62   Temp 97.6 °F (36.4 °C) (Axillary)   Resp 16   Ht 1.575 m (5' 2\")   Wt 55.8 kg (123 lb 0.3 oz)   SpO2 100%   BMI 22.50 kg/m²   General: Well appearing. No apparent distress.   Head: Normocephalic. Atraumatic. Oral mucosa moist.   Neck: No visible masses. No JVD. Conjunctiva clear. Sclera non-icteric.  Heart: Regular rate. Regular rhythm. No murmur.   Lungs: Clear to auscultation.  Abdomen: Soft. Nontender. Nondistended.    Skin: No rash. Warm.  Extremities: No edema     LABS:    CBC:   No results for input(s): \"WBC\", \"HGB\", \"PLT\" in the last 72 hours.    BMP:   Recent Labs     03/30/25  0710 03/31/25  0330 04/01/25  0549    146 140   K 4.7 4.6 4.2   * 114* 111*   CO2 24 24 18*   BUN 34* 31* 38*   CREATININE 1.7* 1.7* 1.7*   GLUCOSE 95 82 94      Hepatic:   No results for input(s): \"AST\", \"ALT\", \"BILITOT\", \"ALKPHOS\" in the last 72 hours.    Invalid input(s): \"ALB\"    Troponin: No results for input(s): \"TROPONINI\" in the last 72 hours.  BNP: No results for input(s): \"BNP\" in the last 72 hours.  Lipids: No results for input(s): \"CHOL\", \"HDL\" in the last 72 hours.    Invalid input(s): \"LDLCALCU\"  ABGs: No results found for: \"PHART\", \"PO2ART\", \"GNH3STQ\"  INR: No results for input(s): \"INR\" in the last 72 hours.      Lab Results   Component Value Date    IRON 22 (L) 03/31/2025    IRONPERSAT 8 (L) 03/31/2025    FERRITIN 109 03/31/2025    FOLATE 7.7 03/31/2025    [id number] 659 03/31/2025     No results found for: \"VITD25\", \"IPTH\"     Assessment and Plan:   Acute kidney injury Stage I on CKD G3A  Acute kidney injury likely secondary to decreased effective circulating volume in the setting of poor oral intake.  Improved with IVF  CR 2.3> 2.4> 1.9> 2> 1.9> 1.8> 1.7> 1.6  mg/dL and over the last 3 days stable  cr 1.7mg/dl  CKD G3B with a baseline serum cr 1.4-1.7mg/dl with an assoc e-GFR=49-45ml/min  Plan  Continue oral intake  Continue  IVF and adjust for the Acidosis  Continue BMP daily    Hypernatremia  Hypernatremia likely secondary to decreased free water intake  Initial sodium of 160 with progressive correction.  Did drop down to 143 within the first 24 hours but with a blood glucose of 600 correcting to around 153  Subsequently sodium improved to 147 then 136> 142>146>140  Plan  Monitor Na+    Hyperkalemia: Resolved  Hyperkalemia in the setting of acute renal injury and poor oral intake  Last potassium 4.2 mmol/L  Plan  Monitor    Electrolytes -Compensated  Hypernatremia-sodium 140 mmol/L.  Last Magnesium 2.1 mg/dL.  Last Calcium 8.8 mg/dL with an albumin of 3.3 g/dL.  Last phosphorus 3.1  mg/dL.  Plan   Monitor electrolytes.    Acid base-Acute Non Anion Gap Met Acidosis   Secondary to CL(-) Expansion of the IVF  Last bicarbonate level 18 mmol/L, Chloride 111 mmol/dL, AG 11 mmol/L today. Their last albumin was 3.3 g/dL.  Plan  Monitor      Anemia in CKD  HgB at goal 10-12g/dl  3/31/25 ferritin 109, Iron Sat 8%, B12 659, folate 7.7  Plan  Follow CBC  Continue  IV Fe++    Thank you for allowing us to participate in the care of Paul Winston MD  1:42 PM  4/1/2025

## 2025-04-01 NOTE — PROGRESS NOTES
Assessment:    Principal Problem:    REGINALDO (acute kidney injury)  Active Problems:    Type II diabetes mellitus (HCC)    Severe protein-calorie malnutrition    FTT (failure to thrive) in adult    Hypernatremia  Resolved Problems:    * No resolved hospital problems. *      Plan:    REGINALDO - secondary to dehydration.  FENa 0.9 consistent with pre-renal.  Not eating/drinking, no interest.  Nephro following.   Avoid nephrotoxic meds.  2.4, improved some 2.2. baseline 1.4.  Cont IV fluids.     Hyperkalemia - from REGINALDO. Resolved quickly. Monitor.  Hypernatriemia - from decreased oral intake.  160. Slowly improving on D5 IV fluids. Now 154. Might need to lower rate or change fluid so not correct too fast.     DM2 - glucose 200-300. On D5 drip.  Start SSI and monitor.     Dementia and FTT  Not oriented. No interest in food/drinking.  Cont IV fluids.  See if correcting lytes will improve appetite.  Might be progression of dementia.   May try appetite stimulating meds. If fails to improve, will need to discuss goals of care with family - hospice or PEG tube.     3/28/25  Sodium improving to 147 from 160 over 24 hrs. Reduce D5 rate from 125 to 75.  Cont to monitor sodium.  Mentation seems better.  Glucose 190-200.  On low SSI. Monitor with D5 change rate.  Creat improved some from 2.4 to 1.9. baseline 1.4. monitor.  Still no appetite. Trial of dronabinol BID.  If not taking anything by mouth will need to discuss goals of care with family.     3/29/25  Sodium back at baseline 136.  IV fluids stopped overnight.  Creat back close to baseline 1.6 (baseline 1.4).  Has appetite on dronabinol.  Will continue to monitor. If labs stable and eating, can likely d/c to SNF in 1-2 days.  PT score 7/24, severely deconditioned.      3/30/25  Sodium 142. Creat 1.7 (baseline 1.4).  Had some improvement on dronabinol with appetite.  Confused and agitated overnight. Will cont to monitor. Ativan PRN.  Glucose low this AM off D5.  64. Suspect poor  glucose stores from poor diet.  Restart D5 NS at 75 mL/hr. Monitor glucose and mentation.  Severely deconditioning - will need SNF.  If poor eating, etc, may to to discuss with family about PEG tube.     3/31/25  On IV fluids due to hypoglycemia off IV fluid.  Sodium trending up 142 to 146. Change from D5 NS to D5 1/2NS, decrease rate from 75 to 50 mL/hr to see if can wean.  Glucose .  Mentation stable. Likely poor stores.  Encourage eating. Reports has appetite.  Monitor glucose.   Severely deconditioning - will need SNF.  Labs showing low iron but normal ferritin.  Cont dronabinol.  If unable to wean IV fluids or appetite still poor, will need to discuss options with family.    4/1/25  Sodium improved from 146 to 140. Nephrology following.  On D5 0.2 NS. Monitor. Glucose stable 100-150 but on IV fluids.  Low iron. Nephrology started IV iron.  Dementia stable - has some agitation. Ativan PRN.  Not eating much even on dronabinol.  Worried not eat enough to maintain glucose.    Tried to call family to discuss goals of care - hospice vs PEG tube placement.  Left voicemail on son's phone.      Code - full  Consults - nephrology  DVT prophylaxis - heparin TID  PT OT - working with them.score 7/24  Discharge plan back to [facility]. Possible long-term       Jens Cohen MD  10:24 AM  4/1/2025

## 2025-04-02 LAB
ANION GAP SERPL CALCULATED.3IONS-SCNC: 8 MMOL/L (ref 7–16)
BUN SERPL-MCNC: 29 MG/DL (ref 6–23)
CALCIUM SERPL-MCNC: 8.5 MG/DL (ref 8.6–10.2)
CHLORIDE SERPL-SCNC: 111 MMOL/L (ref 98–107)
CO2 SERPL-SCNC: 21 MMOL/L (ref 22–29)
CREAT SERPL-MCNC: 1.6 MG/DL (ref 0.7–1.2)
ERYTHROCYTE [DISTWIDTH] IN BLOOD BY AUTOMATED COUNT: 13.6 % (ref 11.5–15)
GFR, ESTIMATED: 40 ML/MIN/1.73M2
GLUCOSE BLD-MCNC: 119 MG/DL (ref 74–99)
GLUCOSE BLD-MCNC: 121 MG/DL (ref 74–99)
GLUCOSE BLD-MCNC: 74 MG/DL (ref 74–99)
GLUCOSE BLD-MCNC: 83 MG/DL (ref 74–99)
GLUCOSE SERPL-MCNC: 85 MG/DL (ref 74–99)
HCT VFR BLD AUTO: 28.5 % (ref 37–54)
HGB BLD-MCNC: 8.9 G/DL (ref 12.5–16.5)
MAGNESIUM SERPL-MCNC: 2 MG/DL (ref 1.6–2.6)
MCH RBC QN AUTO: 30.2 PG (ref 26–35)
MCHC RBC AUTO-ENTMCNC: 31.2 G/DL (ref 32–34.5)
MCV RBC AUTO: 96.6 FL (ref 80–99.9)
PHOSPHATE SERPL-MCNC: 3.1 MG/DL (ref 2.5–4.5)
PLATELET # BLD AUTO: 270 K/UL (ref 130–450)
PMV BLD AUTO: 10.9 FL (ref 7–12)
POTASSIUM SERPL-SCNC: 4.1 MMOL/L (ref 3.5–5)
RBC # BLD AUTO: 2.95 M/UL (ref 3.8–5.8)
SODIUM SERPL-SCNC: 140 MMOL/L (ref 132–146)
WBC OTHER # BLD: 7.1 K/UL (ref 4.5–11.5)

## 2025-04-02 PROCEDURE — 6360000002 HC RX W HCPCS: Performed by: INTERNAL MEDICINE

## 2025-04-02 PROCEDURE — 6370000000 HC RX 637 (ALT 250 FOR IP): Performed by: NURSE PRACTITIONER

## 2025-04-02 PROCEDURE — 82962 GLUCOSE BLOOD TEST: CPT

## 2025-04-02 PROCEDURE — 2580000003 HC RX 258: Performed by: INTERNAL MEDICINE

## 2025-04-02 PROCEDURE — 2500000003 HC RX 250 WO HCPCS: Performed by: INTERNAL MEDICINE

## 2025-04-02 PROCEDURE — 84100 ASSAY OF PHOSPHORUS: CPT

## 2025-04-02 PROCEDURE — 2500000003 HC RX 250 WO HCPCS: Performed by: NURSE PRACTITIONER

## 2025-04-02 PROCEDURE — 6370000000 HC RX 637 (ALT 250 FOR IP): Performed by: INTERNAL MEDICINE

## 2025-04-02 PROCEDURE — 36415 COLL VENOUS BLD VENIPUNCTURE: CPT

## 2025-04-02 PROCEDURE — 83735 ASSAY OF MAGNESIUM: CPT

## 2025-04-02 PROCEDURE — 2060000000 HC ICU INTERMEDIATE R&B

## 2025-04-02 PROCEDURE — 80048 BASIC METABOLIC PNL TOTAL CA: CPT

## 2025-04-02 PROCEDURE — 6360000002 HC RX W HCPCS: Performed by: NURSE PRACTITIONER

## 2025-04-02 PROCEDURE — 85027 COMPLETE CBC AUTOMATED: CPT

## 2025-04-02 RX ORDER — HEPARIN SODIUM 5000 [USP'U]/ML
5000 INJECTION, SOLUTION INTRAVENOUS; SUBCUTANEOUS EVERY 8 HOURS SCHEDULED
Status: DISCONTINUED | OUTPATIENT
Start: 2025-04-02 | End: 2025-04-05 | Stop reason: HOSPADM

## 2025-04-02 RX ADMIN — DRONABINOL 2.5 MG: 2.5 CAPSULE ORAL at 21:12

## 2025-04-02 RX ADMIN — SODIUM CHLORIDE 125 MG: 9 INJECTION, SOLUTION INTRAVENOUS at 10:17

## 2025-04-02 RX ADMIN — PETROLATUM: 420 OINTMENT TOPICAL at 16:39

## 2025-04-02 RX ADMIN — SODIUM CHLORIDE, PRESERVATIVE FREE 10 ML: 5 INJECTION INTRAVENOUS at 10:12

## 2025-04-02 RX ADMIN — PETROLATUM: 420 OINTMENT TOPICAL at 10:12

## 2025-04-02 RX ADMIN — HEPARIN SODIUM 5000 UNITS: 5000 INJECTION INTRAVENOUS; SUBCUTANEOUS at 06:51

## 2025-04-02 RX ADMIN — Medication: at 22:43

## 2025-04-02 RX ADMIN — DRONABINOL 2.5 MG: 2.5 CAPSULE ORAL at 10:12

## 2025-04-02 RX ADMIN — CHOLECALCIFEROL TAB 10 MCG (400 UNIT) 400 UNITS: 10 TAB at 10:12

## 2025-04-02 RX ADMIN — HEPARIN SODIUM 5000 UNITS: 5000 INJECTION INTRAVENOUS; SUBCUTANEOUS at 21:27

## 2025-04-02 NOTE — CARE COORDINATION
CASE MANAGEMENT.... Per palliative care, patients son, Paul, plan is to continue with current treatments and remain Full Code. Patients po intake remains poor. Sx consulted for peg tube placement. Dietician following. On ivf and iv fe. Continue to follow labs/vitals and treat accordingly. Renal on consult. Patient will return to [facility] at discharge. Will need precert and updated PT/OT notes prior to discharge. Facility can accept pending. N 17 in epic. Ambulance forms in [place] need [place] [id number]. Will follow.

## 2025-04-02 NOTE — PROGRESS NOTES
Comprehensive Nutrition Assessment    Type and Reason for Visit:  Reassess    Nutrition Recommendations/Plan:   Recommend liberalize diet to promote protein/energy intake  Modify ONS to wound healing and diabetic BID, add frozen ONS daily  Will continue to monitor for POC/GOC     Malnutrition Assessment:  Malnutrition Status:  Severe malnutrition (03/29/25 1442)    Context:  Chronic Illness     Findings of the 6 clinical characteristics of malnutrition:  Energy Intake:  75% or less estimated energy requirements for 1 month or longer  Weight Loss:  Mild weight loss (10% over 1 year)     Body Fat Loss:  Severe body fat loss Triceps, Orbital   Muscle Mass Loss:  Severe muscle mass loss Clavicles (pectoralis & deltoids), Temples (temporalis)  Fluid Accumulation:  Unable to assess Extremities (multiple factors)   Strength:  Not Performed    Nutrition Assessment:    Pt continues w/ poor appetite/intake. Pt generally consuming <50% of meals. Recommend liberalize diet to promote protein/energy intake. Continue wound healing and diabetic ONS, will add frozen ONS daily. Will continue to monitor for POC/GOC.    Nutrition Related Findings:    A&O x1, missing teeth, abd soft/flat/nontender, +BS, BLE +1 edema, +6.5 L, BUN/Cr 29/1.6, Marinol, decreased appetite Wound Type: Pressure Injury, Stage III       Current Nutrition Intake & Therapies:    Average Meal Intake: 0%, 1-25%, 26-50%, 51-75%, % (Majority <50%)  Average Supplements Intake: Unable to assess  ADULT ORAL NUTRITION SUPPLEMENT; Breakfast, Dinner; Wound Healing Oral Supplement  ADULT DIET; Easy to Chew; 4 carb choices (60 gm/meal); Low Fat/Low Chol/High Fiber/2 gm Na; Low Potassium (Less than 3000 mg/day)  ADULT ORAL NUTRITION SUPPLEMENT; Breakfast, Lunch; Diabetic Oral Supplement  ADULT ORAL NUTRITION SUPPLEMENT; Dinner; Frozen Oral Supplement    Anthropometric Measures:  Height: 157.5 cm (5' 2\")  Ideal Body Weight (IBW): 118 lbs (54 kg)    Admission Body

## 2025-04-02 NOTE — CONSULTS
Nephrology Consult Note      Reason for Consult: Hyponatremia and REGINALDO  Date of Service: 3/29/2025   Chief Complaint: had concerns including OTHER (ABNORMAL LABS- SODIUM 158).  History Obtained From: patient electronic medical record     History of Presenting illness:   Paul Pemberton is a 89 y.o. male with a past medical history of diabetes hypertension and dementia.     They presented on 3/26/2025 complaining of abnormal labs.  He presented with decreased oral intake for several days.  He is a poor historian and provides conflicting information.  He was subsequently admitted for a potassium of 5.9, sodium 160 and a creatinine of 2.3.  Nephrology was consulted for hyperkalemia, hyponatremia and REGINALDO.    Past Medical History:   Past Medical History:   Diagnosis Date    Allergic rhinitis     Diabetic neuropathy (HCC)     Hyperlipidemia     Hypertension     Osteoarthritis     Type 2 diabetes mellitus without complication (HCC)        Past Surgical History:   Past Surgical History:   Procedure Laterality Date    URETHRAL STRICTURE DILATATION         Medications Prior to Admission:   Medications Prior to Admission: vitamin D (ERGOCALCIFEROL) 1.25 MG (92877 UT) CAPS capsule, Take 1 capsule by mouth once a week Every Tuesday give for low vitamin D  Cholecalciferol (VITAMIN D) 10 MCG (400 UNIT) CAPS Capsule, Take 1 capsule by mouth daily  Multiple Vitamin (MULTIVITAMIN) TABS tablet, Take 1 tablet by mouth daily (Patient not taking: Reported on 3/27/2025)  bismuth subsalicylate (PEPTO BISMOL) 262 MG/15ML suspension, Take 30 mLs by mouth every 6 hours as needed for Indigestion or Other (Patient not taking: Reported on 3/27/2025)    Allergies:   Patient has no known allergies.    Social History:    reports that he has never smoked. He has never used smokeless tobacco. He reports that he does not drink alcohol.    Family History:   Family history is unknown by patient.    REVIEW OF SYSTEMS  Minimum of 12 system ROS was performed 
  Palliative Care Department  [phone]  Palliative Care Initial Consult  Ward Mera MD    Paul Pemberton  [id number]  Hospital Day: 7  Location of Service: [facility]  Date of Initial Consult: 4/1/25  Referring Provider: Dr. Cohen  Palliative Medicine was consulted for assistance with: Code status Discussion, Assist with goals of care    ASSESSMENT & PLAN:     Alzheimer's dementia  Patient confused at baseline  Had declining appetite  According to family has never been a good eater  Has had worsening confusion  Recently had hospitalization for fall    Symptoms related to dementia at this time we will continue to follow    Palliative Care Encounter  Dementia  Will continue to follow for ongoing monitoring of progression of Anxiety and Anorexia as well as for appropriateness for hospice care due to Dementia  Will continue to evaluate test results related to and response to treatment of Dementia and medication effectiveness for Anxiety and Anorexia,  Will obtain testing as needed to monitor medication results:N/A  Will continue to assess patient status including monitor for opiate induced constipation  Ongoing counseling of patient and family regarding diagnoses and prognosis of Dementia  Will continue treatment including none at this time    Goals of care: Live Longer and Improve or Maintain Function/Quality of Life  Surrogate: Child  Prognosis: depends upon goals  Spiritual assessment: No spiritual distress identified   Bereavement and grief: Low Risk Bereavement    Advance Care Planning Documents:    Healthcare Power of : Not completed  Financial Power of : Not completed  Living Will: Not completed  Code Status: Full code      Discharge planning: to be determined    Patient meets criteria for general inpatient hospice care including the following: N/A- Palliative Care Patient    Referrals to: none today    Discussed patient and the plan of care with the other 
(CARDIA)     Difficulty of Paying Living Expenses: Not hard at all   Food Insecurity: No Food Insecurity (3/27/2025)    Hunger Vital Sign     Worried About Running Out of Food in the Last Year: Never true     Ran Out of Food in the Last Year: Never true   Transportation Needs: No Transportation Needs (3/27/2025)    PRAPARE - Transportation     Lack of Transportation (Medical): No     Lack of Transportation (Non-Medical): No   Physical Activity: Inactive (2/28/2024)    Exercise Vital Sign     Days of Exercise per Week: 0 days     Minutes of Exercise per Session: 0 min   Stress: Not on file   Social Connections: Not on file   Intimate Partner Violence: Not on file   Housing Stability: Low Risk  (3/27/2025)    Housing Stability Vital Sign     Unable to Pay for Housing in the Last Year: No     Number of Times Moved in the Last Year: 1     Homeless in the Last Year: No         Recent Labs     04/02/25  0527   WBC 7.1   HGB 8.9*   HCT 28.5*   MCV 96.6          Recent Labs     03/31/25  0330 04/01/25  0549 04/02/25  0527    140 140   K 4.6 4.2 4.1   CO2 24 18* 21*   PHOS 3.5 3.1 3.1   BUN 31* 38* 29*   CREATININE 1.7* 1.7* 1.6*       No results for input(s): \"ALKPHOS\", \"ALT\", \"AST\", \"BILITOT\", \"BILIDIR\", \"LABALBU\", \"INR\", \"LIPASE\", \"AMYLASE\" in the last 72 hours.    Invalid input(s): \"PROT\"      Intake/Output Summary (Last 24 hours) at 4/2/2025 1733  Last data filed at 4/2/2025 1400  Gross per 24 hour   Intake 90 ml   Output 500 ml   Net -410 ml       Review of Systems    I have reviewed relevant labs from this admission and my interpretation is included in my assessment and plan      Physical exam:   BP (!) 96/47   Pulse 61   Temp 97.5 °F (36.4 °C) (Axillary)   Resp 20   Ht 1.575 m (5' 2\")   Wt 58 kg (127 lb 13.9 oz)   SpO2 100%   BMI 23.39 kg/m²   General appearance:  lying in bed, NAD  Head: NCAT, PERRL, EOMI, red conjunctiva  Neck: supple, no masses, trachea midline  Lungs: symmetric chest rise, no

## 2025-04-02 NOTE — PLAN OF CARE
Problem: Chronic Conditions and Co-morbidities  Goal: Patient's chronic conditions and co-morbidity symptoms are monitored and maintained or improved  4/2/2025 0343 by Norah Davey RN  Outcome: Progressing     Problem: Discharge Planning  Goal: Discharge to home or other facility with appropriate resources  4/2/2025 0343 by Norah Davey RN  Outcome: Progressing     Problem: Safety - Adult  Goal: Free from fall injury  4/2/2025 0343 by Norah Davey RN  Outcome: Progressing     Problem: Skin/Tissue Integrity  Goal: Skin integrity remains intact  Description: 1.  Monitor for areas of redness and/or skin breakdown  2.  Assess vascular access sites hourly  3.  Every 4-6 hours minimum:  Change oxygen saturation probe site  4.  Every 4-6 hours:  If on nasal continuous positive airway pressure, respiratory therapy assess nares and determine need for appliance change or resting period  4/2/2025 0343 by oNrah Davey RN  Outcome: Progressing     Problem: ABCDS Injury Assessment  Goal: Absence of physical injury  4/2/2025 0343 by Norah Davey RN  Outcome: Progressing     Problem: Pain  Goal: Verbalizes/displays adequate comfort level or baseline comfort level  4/2/2025 0343 by Norah Davey RN  Outcome: Progressing     Problem: Nutrition Deficit:  Goal: Optimize nutritional status  4/2/2025 0343 by Norah Davey RN  Outcome: Progressing

## 2025-04-02 NOTE — PROGRESS NOTES
[facility]                                Progress note    Subjective:  Denies all complaints    Objective:  Lying almost completely flat in bed on his left side  Conversing without difficulty  No acute distress  No family present at the bedside  /82   Pulse 77   Temp 97.7 °F (36.5 °C) (Axillary)   Resp 18   Ht 1.575 m (5' 2\")   Wt 58 kg (127 lb 13.9 oz)   SpO2 100%   BMI 23.39 kg/m²   CONST:  Well developed, thin, frail appearing elderly  male who appears stated age. Awake, alert, cooperative, no apparent distress  HEENT:   Head- Normocephalic, atraumatic   Eyes- Conjunctivae pink, anicteric  Throat- Oral mucosa pink and moist  Neck-  No stridor, trachea midline, no jugular venous distention. No adenopathy   CHEST: Chest symmetrical and non-tender to palpation. No accessory muscle use or intercostal retractions  RESPIRATORY: Lung sounds - clear throughout fields   CARDIOVASCULAR:     No carotid bruit  Heart Inspection- shows no noted pulsations  Heart Palpation- no heaves or thrills; PMI is non-displaced   Heart Ausculation- Regular rate and rhythm, no murmur. No s3, s4 or rub   PV: No lower extremity edema. No varicosities. Pedal pulses palpable, no clubbing or cyanosis   ABDOMEN: Soft, non-tender to light palpation. Bowel sounds present. No palpable masses no organomegaly; no abdominal bruit  MS: Good muscle strength and tone. No atrophy or abnormal movements.   : Deferred  SKIN: Warm and dry no statis dermatitis or ulcers   NEURO / PSYCH: Telesitter at the bedside, confusion noted. Speech clear and appropriate. Follows all commands. Pleasant affect      Recent Labs     04/02/25  0527   WBC 7.1   HGB 8.9*   HCT 28.5*          Recent Labs     03/31/25  0330 04/01/25  0549 04/02/25  0527    140 140   K 4.6 4.2 4.1   * 111* 111*   CO2 24 18* 21*   BUN 31* 38* 29*   CREATININE 1.7* 1.7* 1.6*   CALCIUM 8.8 8.8 8.5*     Assessment:     Principal  diet.  Restart D5 NS at 75 mL/hr. Monitor glucose and mentation.  Severely deconditioning - will need SNF.  If poor eating, etc, may to to discuss with family about PEG tube.     3/31/25  On IV fluids due to hypoglycemia off IV fluid.  Sodium trending up 142 to 146. Change from D5 NS to D5 1/2NS, decrease rate from 75 to 50 mL/hr to see if can wean.  Glucose .  Mentation stable. Likely poor stores.  Encourage eating. Reports has appetite.  Monitor glucose.   Severely deconditioning - will need SNF.  Labs showing low iron but normal ferritin.  Cont dronabinol.  If unable to wean IV fluids or appetite still poor, will need to discuss options with family.     4/1/25  Sodium improved from 146 to 140. Nephrology following.  On D5 0.2 NS. Monitor. Glucose stable 100-150 but on IV fluids.  Low iron. Nephrology started IV iron.  Dementia stable - has some agitation. Ativan PRN.  Not eating much even on dronabinol.  Worried not eat enough to maintain glucose.    Tried to call family to discuss goals of care - hospice vs PEG tube placement.  Left voicemail on son's phone.     04/02/2025  Hypernatremia secondary to decreased oral intake:   Resolved with Na now 140  Poor appetite despite Marinol  Remains on IV fluids secondary to poor oral intake and intermittent hypoglycemia  Hospice vs PEG tube insertion at the discretion of family decision--> Contacted patient's son (Paul Pemberton . [phone]) via telephone, he is requesting PEG tube placement   Consult General Surgery   REGINALDO superimposed on CKD stage IIIa (baseline SCr 1.4-1.7): Resolved with BUN/SCR now 29/1.6 (04/02)  Monitor BMP  Monitor blood glucose, continue SSI low-dose correction  Anemia of chronic disease, stable with H&H 8.9/28.5 (04/02)  Monitor CBC  Continue IV Iron supplementation      Code status: FULL, Palliative Care, General Surgery  Consultants: Nephrology  DVT Prophylaxis Heparin 5000 units SQ every 8 hours  PT/OT  Discharge planning

## 2025-04-02 NOTE — PROGRESS NOTES
DVT Prophylaxis Adjustment Policy (DVT Prophylaxis)     This patient is on DVT Prophylaxis medication that requires a dose adjustment      Date Body Weight IBW  Adjusted BW SCr  CrCl Dialysis status   4/2/2025 58 kg (127 lb 13.9 oz) Ideal body weight: 54.6 kg (120 lb 5.9 oz)  Adjusted ideal body weight: 56 kg (123 lb 5.9 oz) Serum creatinine: 1.6 mg/dL (H) 04/02/25 0527  Estimated creatinine clearance: 22 mL/min (A) N/a       Pharmacy has dose-adjusted the DVT Prophylaxis regimen to match   the recommendations from the following table        Ordered Medication:Heparin 5000 Units BID    Order Changed/converted to: Heparin 5000 Units TID      These changes were made per protocol according to the [facility] Pharmacist   Review for Appropriate Use and Automatic Dose Adjustments of   Subcutaneous Anticoagulants Policy     *Please note this dose may need readjusted if patient's condition changes.    Please contact pharmacy with any questions regarding these changes.    cipriano garcia RPH  4/2/2025  6:30 AM

## 2025-04-02 NOTE — PROGRESS NOTES
Spiritual Health History and Assessment/Progress Note  [facility]    Palliative Care,  ,  ,      Name: Paul Pemberton MRN: [id number]    Age: 89 y.o.     Sex: male   Language: English   Voodoo: Advent   REGINALDO (acute kidney injury)     Date: 4/2/2025                           Spiritual Assessment began in [facility] 4S INTERMEDIATE 1        Referral/Consult From: Palliative Care   Encounter Overview/Reason: Palliative Care  Service Provided For: Patient    Debbie, Belief, Meaning:   Patient is connected with a debbie tradition or spiritual practice  Family/Friends No family/friends present      Importance and Influence:  Patient has spiritual/personal beliefs that influence decisions regarding their health  Family/Friends No family/friends present    Community:  Patient is connected with a spiritual community  Family/Friends No family/friends present    Assessment and Plan of Care:     Patient Interventions include: Facilitated expression of thoughts and feelings and Provided sacramental/Spiritism ritual  Family/Friends Interventions include: No family/friends present    Patient Plan of Care: No spiritual needs identified for follow-up  Family/Friends Plan of Care: No family/friends present    Electronically signed by Chaplain Denny on 4/2/2025 at 3:16 PM

## 2025-04-03 LAB
ANION GAP SERPL CALCULATED.3IONS-SCNC: 10 MMOL/L (ref 7–16)
BASOPHILS # BLD: 0.01 K/UL (ref 0–0.2)
BASOPHILS NFR BLD: 0 % (ref 0–2)
BUN SERPL-MCNC: 41 MG/DL (ref 6–23)
CALCIUM SERPL-MCNC: 9.1 MG/DL (ref 8.6–10.2)
CHLORIDE SERPL-SCNC: 105 MMOL/L (ref 98–107)
CO2 SERPL-SCNC: 21 MMOL/L (ref 22–29)
CREAT SERPL-MCNC: 1.5 MG/DL (ref 0.7–1.2)
EOSINOPHIL # BLD: 0.21 K/UL (ref 0.05–0.5)
EOSINOPHILS RELATIVE PERCENT: 3 % (ref 0–6)
ERYTHROCYTE [DISTWIDTH] IN BLOOD BY AUTOMATED COUNT: 13.5 % (ref 11.5–15)
GFR, ESTIMATED: 42 ML/MIN/1.73M2
GLUCOSE BLD-MCNC: 101 MG/DL (ref 74–99)
GLUCOSE BLD-MCNC: 72 MG/DL (ref 74–99)
GLUCOSE BLD-MCNC: 87 MG/DL (ref 74–99)
GLUCOSE BLD-MCNC: 89 MG/DL (ref 74–99)
GLUCOSE SERPL-MCNC: 91 MG/DL (ref 74–99)
HCT VFR BLD AUTO: 33.2 % (ref 37–54)
HGB BLD-MCNC: 10.5 G/DL (ref 12.5–16.5)
IMM GRANULOCYTES # BLD AUTO: 0.03 K/UL (ref 0–0.58)
IMM GRANULOCYTES NFR BLD: 1 % (ref 0–5)
LYMPHOCYTES NFR BLD: 1.86 K/UL (ref 1.5–4)
LYMPHOCYTES RELATIVE PERCENT: 28 % (ref 20–42)
MAGNESIUM SERPL-MCNC: 2.1 MG/DL (ref 1.6–2.6)
MCH RBC QN AUTO: 30.2 PG (ref 26–35)
MCHC RBC AUTO-ENTMCNC: 31.6 G/DL (ref 32–34.5)
MCV RBC AUTO: 95.4 FL (ref 80–99.9)
MONOCYTES NFR BLD: 0.47 K/UL (ref 0.1–0.95)
MONOCYTES NFR BLD: 7 % (ref 2–12)
NEUTROPHILS NFR BLD: 61 % (ref 43–80)
NEUTS SEG NFR BLD: 4.06 K/UL (ref 1.8–7.3)
PHOSPHATE SERPL-MCNC: 2.9 MG/DL (ref 2.5–4.5)
PLATELET # BLD AUTO: 284 K/UL (ref 130–450)
PMV BLD AUTO: 11 FL (ref 7–12)
POTASSIUM SERPL-SCNC: 5.1 MMOL/L (ref 3.5–5)
RBC # BLD AUTO: 3.48 M/UL (ref 3.8–5.8)
SODIUM SERPL-SCNC: 136 MMOL/L (ref 132–146)
WBC OTHER # BLD: 6.6 K/UL (ref 4.5–11.5)

## 2025-04-03 PROCEDURE — 6360000002 HC RX W HCPCS: Performed by: NURSE PRACTITIONER

## 2025-04-03 PROCEDURE — 80048 BASIC METABOLIC PNL TOTAL CA: CPT

## 2025-04-03 PROCEDURE — 82962 GLUCOSE BLOOD TEST: CPT

## 2025-04-03 PROCEDURE — 2580000003 HC RX 258: Performed by: INTERNAL MEDICINE

## 2025-04-03 PROCEDURE — 83735 ASSAY OF MAGNESIUM: CPT

## 2025-04-03 PROCEDURE — 97535 SELF CARE MNGMENT TRAINING: CPT

## 2025-04-03 PROCEDURE — 2500000003 HC RX 250 WO HCPCS: Performed by: NURSE PRACTITIONER

## 2025-04-03 PROCEDURE — 85025 COMPLETE CBC W/AUTO DIFF WBC: CPT

## 2025-04-03 PROCEDURE — 84100 ASSAY OF PHOSPHORUS: CPT

## 2025-04-03 PROCEDURE — 97530 THERAPEUTIC ACTIVITIES: CPT

## 2025-04-03 PROCEDURE — 6370000000 HC RX 637 (ALT 250 FOR IP): Performed by: NURSE PRACTITIONER

## 2025-04-03 PROCEDURE — 2060000000 HC ICU INTERMEDIATE R&B

## 2025-04-03 PROCEDURE — 6360000002 HC RX W HCPCS: Performed by: INTERNAL MEDICINE

## 2025-04-03 PROCEDURE — 6370000000 HC RX 637 (ALT 250 FOR IP): Performed by: INTERNAL MEDICINE

## 2025-04-03 RX ORDER — SODIUM CHLORIDE, SODIUM LACTATE, POTASSIUM CHLORIDE, CALCIUM CHLORIDE 600; 310; 30; 20 MG/100ML; MG/100ML; MG/100ML; MG/100ML
INJECTION, SOLUTION INTRAVENOUS CONTINUOUS
Status: DISCONTINUED | OUTPATIENT
Start: 2025-04-03 | End: 2025-04-04

## 2025-04-03 RX ORDER — HYDROXYZINE HYDROCHLORIDE 50 MG/ML
25 INJECTION, SOLUTION INTRAMUSCULAR EVERY 6 HOURS PRN
Status: DISCONTINUED | OUTPATIENT
Start: 2025-04-03 | End: 2025-04-05 | Stop reason: HOSPADM

## 2025-04-03 RX ADMIN — DRONABINOL 2.5 MG: 2.5 CAPSULE ORAL at 19:22

## 2025-04-03 RX ADMIN — PETROLATUM: 420 OINTMENT TOPICAL at 08:39

## 2025-04-03 RX ADMIN — SODIUM CHLORIDE 125 MG: 9 INJECTION, SOLUTION INTRAVENOUS at 08:45

## 2025-04-03 RX ADMIN — SODIUM CHLORIDE, SODIUM LACTATE, POTASSIUM CHLORIDE, AND CALCIUM CHLORIDE: .6; .31; .03; .02 INJECTION, SOLUTION INTRAVENOUS at 08:44

## 2025-04-03 RX ADMIN — HYDROXYZINE HYDROCHLORIDE 25 MG: 50 INJECTION, SOLUTION INTRAMUSCULAR at 11:00

## 2025-04-03 RX ADMIN — HEPARIN SODIUM 5000 UNITS: 5000 INJECTION INTRAVENOUS; SUBCUTANEOUS at 06:50

## 2025-04-03 RX ADMIN — HEPARIN SODIUM 5000 UNITS: 5000 INJECTION INTRAVENOUS; SUBCUTANEOUS at 22:14

## 2025-04-03 RX ADMIN — SODIUM CHLORIDE, PRESERVATIVE FREE 10 ML: 5 INJECTION INTRAVENOUS at 08:40

## 2025-04-03 RX ADMIN — PETROLATUM: 420 OINTMENT TOPICAL at 19:22

## 2025-04-03 NOTE — PROGRESS NOTES
Patient agitated and combative this AM, refusing breakfast, oral liquids, and all oral medications. Patient ripped IV out and began getting physically and verbally aggressive with staff while trying to provide issa/incontinence care to patient. This RN and staff provided patient with emotional support and explanations. Patient not receptive and continued with verbal and physical aggression.

## 2025-04-03 NOTE — PROGRESS NOTES
Paged Gianna Pandey-NP notifying of patient's agitation and combativeness, along with loss of peripheral IV access.

## 2025-04-03 NOTE — PROGRESS NOTES
Nephrology Progress Note      Reason for Consult: Hyponatremia and REGINALDO  Date of Service: 4/3/2025   Chief Complaint: had concerns including OTHER (ABNORMAL LABS- SODIUM 158).  History Obtained From: patient electronic medical record     History of Presenting illness:   From the 3/30/25 note of Dr. Merino: Paul Pemberton is a 89 y.o. male with a past medical history of diabetes hypertension and dementia.     They presented on 3/26/2025 complaining of abnormal labs.  He presented with decreased oral intake for several days.  He is a poor historian and provides conflicting information.  He was subsequently admitted for a potassium of 5.9, sodium 160 and a creatinine of 2.3.  Nephrology was consulted for hyperkalemia, hyponatremia and REGINALDO.    Interval history  4/3/25: Pt sleeping at the time of my visit. Arousable, did not interact with me    Past Medical History:   Past Medical History:   Diagnosis Date    Allergic rhinitis     Diabetic neuropathy (HCC)     Hyperlipidemia     Hypertension     Osteoarthritis     Type 2 diabetes mellitus without complication (HCC)        Past Surgical History:   Past Surgical History:   Procedure Laterality Date    URETHRAL STRICTURE DILATATION         Medications Prior to Admission:   Medications Prior to Admission: vitamin D (ERGOCALCIFEROL) 1.25 MG (63443 UT) CAPS capsule, Take 1 capsule by mouth once a week Every Tuesday give for low vitamin D  Cholecalciferol (VITAMIN D) 10 MCG (400 UNIT) CAPS Capsule, Take 1 capsule by mouth daily  Multiple Vitamin (MULTIVITAMIN) TABS tablet, Take 1 tablet by mouth daily (Patient not taking: Reported on 3/27/2025)  bismuth subsalicylate (PEPTO BISMOL) 262 MG/15ML suspension, Take 30 mLs by mouth every 6 hours as needed for Indigestion or Other (Patient not taking: Reported on 3/27/2025)    Allergies:   Patient has no known allergies.    Social History:    reports that he has never smoked. He has never used smokeless tobacco. He reports that he does  2.3> 2.4> 1.9> 2> 1.9> 1.8> 1.7> 1.6>1.5 mg/dL   CKD G3B with a baseline serum cr 1.4-1.7mg/dl with an assoc e-GFR=49-45ml/min  Plan  Continue oral intake  Continue  IVF   Continue BMP daily    Hypernatremia  Hypernatremia likely secondary to decreased free water intake  Initial sodium of 160 with progressive correction.  Did drop down to 143 within the first 24 hours but with a blood glucose of 600 correcting to around 153  Subsequently sodium improved to 147 then 136> 142>146>140>136  Plan  Monitor Na+  Na+ stable on the IVF    Hyperkalemia:   Hyperkalemia in the setting of acute renal injury and poor oral intake  Last potassium 5.1 mmol/L  Plan  Monitor    Electrolytes -Compensated  Hypernatremia-Resolved-sodium 136 mmol/L.  Last Magnesium 2.1 mg/dL.  Last Calcium 9.1 mg/dL with an albumin of 3.3 g/dL.  Last phosphorus 2.9  mg/dL.  Plan   Monitor electrolytes.    Acid base-Acute Non Anion Gap Met Acidosis   Secondary to CL(-) Expansion of the IVF  Last bicarbonate level 21 mmol/L, Chloride 105 mmol/dL, AG 10 mmol/L today. Their last albumin was 3.3 g/dL.  Plan  Monitor  on the LR    Anemia in CKD  HgB below goal 10-12g/dl-dropped from 10.7-->8.9g/dl since 3/29/25 may reflect a component dilution as HgB up to 10.5 g/dl this AM  3/31/25 ferritin 109, Iron Sat 8%, B12 659, folate 7.7  Plan  Follow CBC  Continue  IV Fe++    Thank you for allowing us to participate in the care of Paul Winston MD  1:05 PM  4/3/2025

## 2025-04-03 NOTE — PROGRESS NOTES
Palliative Care Department  [phone]  Palliative Care Progress Note  Zelda Gamez MD    Paul Pemberton  [id number]  Hospital Day: 9  Location of Service: [facility] [place]  Date of Initial Consult: 4/1/25  Referring Provider: Dr. Cohen  Palliative Medicine was consulted for assistance with: Code status Discussion, Assist with goals of care    ASSESSMENT & PLAN:     Alzheimer's dementia  Patient confused at baseline  Had declining appetite  According to family has never been a good eater  Has had worsening confusion  Recently had hospitalization for fall  PEG tube was cancelled due to PO improvement yesterday  Poor appetite again today    Symptoms related to dementia at this time we will continue to follow    Palliative Care Encounter  Dementia  Will continue to follow for ongoing monitoring of progression of Anxiety and Anorexia as well as for appropriateness for hospice care due to Dementia  Will continue to evaluate test results related to and response to treatment of Dementia and medication effectiveness for Anxiety and Anorexia,  Will obtain testing as needed to monitor medication results:N/A  Will continue to assess patient status including monitor for opiate induced constipation  Ongoing counseling of patient and family regarding diagnoses and prognosis of Dementia  Will continue treatment including none at this time    Goals of care: Live Longer and Improve or Maintain Function/Quality of Life  Surrogate: Child  Prognosis: depends upon goals  Spiritual assessment: No spiritual distress identified   Bereavement and grief: Low Risk Bereavement    Advance Care Planning Documents:    Healthcare Power of : Not completed  Financial Power of : Not completed  Living Will: Not completed  Code Status: Full code      Discharge planning: to be determined    Patient meets criteria for general inpatient hospice care including the following: N/A- Palliative Care Patient    Referrals to:  normal.         Behavior: Behavior normal.           Objective data reviewed: labs, images, records, medication use, vitals, and chart    Assessed by: patient and provider.    All other systems were reviewed and are negative.    Current Medications:  Inpatient medications reviewed: yes  Home Medications reviewed: yes  OARRS Reviewed:No Report Available      Note: This report was completed using computerize voiced recognition software.  Every effort has been made to ensure accuracy; however, inadvertent computerized transcription errors may be present.

## 2025-04-03 NOTE — PROGRESS NOTES
[facility]                                Progress note    Subjective:  Denies all complaints  States, \"I feel much better now\"    Objective:  Lying almost completely flat in bed on his right side  Conversing without difficulty  No acute distress  No family present at the bedside  Nursing staff reported agitation with aggression earlier today, he received Vistaril IM with improvement  BP (!) 122/52   Pulse 68   Temp 98.1 °F (36.7 °C) (Oral)   Resp 20   Ht 1.575 m (5' 2\")   Wt 58 kg (127 lb 13.9 oz)   SpO2 98%   BMI 23.39 kg/m²   CONST:  Well developed, thin, frail appearing elderly  male who appears stated age. Awake, alert, cooperative, no apparent distress  HEENT:   Head- Normocephalic, atraumatic   Eyes- Conjunctivae pink, anicteric  Throat- Oral mucosa pink and moist  Neck-  No stridor, trachea midline, no jugular venous distention. No adenopathy   CHEST: Chest symmetrical and non-tender to palpation. No accessory muscle use or intercostal retractions  RESPIRATORY: Lung sounds - clear throughout fields   CARDIOVASCULAR:     No carotid bruit  Heart Inspection- shows no noted pulsations  Heart Palpation- no heaves or thrills; PMI is non-displaced   Heart Ausculation- Regular rate and rhythm, no murmur. No s3, s4 or rub   PV: No lower extremity edema. No varicosities. Pedal pulses palpable, no clubbing or cyanosis   ABDOMEN: Soft, non-tender to light palpation. Bowel sounds present. No palpable masses no organomegaly; no abdominal bruit  MS: Good muscle strength and tone. No atrophy or abnormal movements.   : Deferred  SKIN: Warm and dry no statis dermatitis or ulcers   NEURO / PSYCH: Telesitter at the bedside, confusion noted. Speech clear and appropriate. Follows all commands. Pleasant affect Telesitter at the bedside     Recent Labs     04/02/25  0527 04/03/25  0400   WBC 7.1 6.6   HGB 8.9* 10.5*   HCT 28.5* 33.2*    284       Recent Labs     04/01/25  0531

## 2025-04-03 NOTE — PROGRESS NOTES
Occupational Therapy  OT BEDSIDE TREATMENT NOTE      Date:4/3/2025  Patient Name: Paul Pemberton  MRN: [id number]  : 1929  Room: [address] [place] [address]     Evaluating OT: Tanya Portillo, OTR/L - [id number]     Referring Provider: Jens Cohen MD  Specific Provider Orders/Date: \"OT eval and treat\" - 3/27/2025     Diagnosis: Hyperkalemia [E87.5]  Hypernatremia [E87.0]  REGINALDO (acute kidney injury) [N17.9]      Pertinent Medical History: dementia, DM, OA, HTN, neuropathy      Precautions: fall risk, skin integrity     Assessment of Current Deficits:    [x] Functional mobility             [x] ADLs          [x] Strength                  [x] Cognition   [x] Functional transfers           [x] IADLs         [x] Safety Awareness   [x] Endurance   [] Fine Motor Coordination    [x] Balance      [] Vision/Perception   [x] Sensation    [] Gross Motor Coordination [] ROM          [] Delirium                  [] Motor Control      OT PLAN OF CARE   OT POC is based on physician orders, patient diagnosis, and results of clinical assessment.  Frequency/Duration 1-3 days/week for 2-4 weeks PRN   Specific OT Treatment Interventions to Include:   * Instruction/training on adapted ADL techniques and AE recommendations to increase functional independence within precautions       * Training on energy conservation strategies, correct breathing pattern and techniques to improve independence/tolerance for self-care routine  * Functional transfer/mobility training/DME recommendations for increased independence, safety, and fall prevention  * Patient/Family education to increase follow through with safety techniques and functional independence  * Recommendation of environmental modifications for increased safety with functional transfers/mobility and ADLs  * Cognitive retraining/development of therapeutic activities to improve problem solving, judgement, memory, and attention for increased safety/participation in ADL/IADL tasks  * Therapeutic  energy conservation/work simplification.    Instruction/training on bed mobility and dynamic balance for increased independence with self care.    Further skilled OT treatment indicated to increase patient's safety and independence with completion of ADL/IADL tasks in order to maximize patient's functional independence and quality of life.    Education: Patient education provided regardin) OT role/purpose and plan of care 2)use of call light for assistance. Patient verbalized understanding.    Patient has made fair progress towards set goals.  Continue OT plan of care.    Time In: 155  Time Out: 1411  Total Treatment Time: 16 minutes      Minutes Units   Therapeutic Ex 54965     Therapeutic Activities 40813     ADL/Self Care 66097 16 1   Orthotic Management 14533     Neuro Re-Ed 71499     Non-Billable Time N/A ---     SHADI Martinez/L  License Number: [id number]

## 2025-04-03 NOTE — PROGRESS NOTES
Patient showed decent appetite throughout the night. He would eat the majority of snacks provided including pudding, ibrahima crackers, applesauce, and popsicles.

## 2025-04-03 NOTE — PLAN OF CARE
Problem: Nutrition Deficit:  Goal: Optimize nutritional status  Outcome: Progressing  Flowsheets (Taken 4/2/2025 1041 by Gladis Sloan RD)  Nutrient intake appropriate for improving, restoring, or maintaining nutritional needs:   Assess nutritional status and recommend course of action   Monitor oral intake, labs, and treatment plans   Recommend appropriate diets, oral nutritional supplements, and vitamin/mineral supplements     Problem: Pain  Goal: Verbalizes/displays adequate comfort level or baseline comfort level  Outcome: Progressing     Problem: ABCDS Injury Assessment  Goal: Absence of physical injury  Outcome: Progressing     Problem: Skin/Tissue Integrity  Goal: Skin integrity remains intact  Description: 1.  Monitor for areas of redness and/or skin breakdown  2.  Assess vascular access sites hourly  3.  Every 4-6 hours minimum:  Change oxygen saturation probe site  4.  Every 4-6 hours:  If on nasal continuous positive airway pressure, respiratory therapy assess nares and determine need for appliance change or resting period  Outcome: Progressing     Problem: Safety - Adult  Goal: Free from fall injury  Outcome: Progressing     Problem: Discharge Planning  Goal: Discharge to home or other facility with appropriate resources  Outcome: Progressing     Problem: Chronic Conditions and Co-morbidities  Goal: Patient's chronic conditions and co-morbidity symptoms are monitored and maintained or improved  Outcome: Progressing

## 2025-04-04 LAB
ANION GAP SERPL CALCULATED.3IONS-SCNC: 8 MMOL/L (ref 7–16)
BASOPHILS # BLD: 0.02 K/UL (ref 0–0.2)
BASOPHILS NFR BLD: 0 % (ref 0–2)
BUN SERPL-MCNC: 51 MG/DL (ref 6–23)
CALCIUM SERPL-MCNC: 8.5 MG/DL (ref 8.6–10.2)
CHLORIDE SERPL-SCNC: 107 MMOL/L (ref 98–107)
CO2 SERPL-SCNC: 25 MMOL/L (ref 22–29)
CREAT SERPL-MCNC: 1.8 MG/DL (ref 0.7–1.2)
EOSINOPHIL # BLD: 0.23 K/UL (ref 0.05–0.5)
EOSINOPHILS RELATIVE PERCENT: 3 % (ref 0–6)
ERYTHROCYTE [DISTWIDTH] IN BLOOD BY AUTOMATED COUNT: 13.8 % (ref 11.5–15)
GFR, ESTIMATED: 36 ML/MIN/1.73M2
GLUCOSE BLD-MCNC: 107 MG/DL (ref 74–99)
GLUCOSE BLD-MCNC: 118 MG/DL (ref 74–99)
GLUCOSE BLD-MCNC: 70 MG/DL (ref 74–99)
GLUCOSE SERPL-MCNC: 71 MG/DL (ref 74–99)
HCT VFR BLD AUTO: 28.3 % (ref 37–54)
HGB BLD-MCNC: 9.3 G/DL (ref 12.5–16.5)
IMM GRANULOCYTES # BLD AUTO: 0.03 K/UL (ref 0–0.58)
IMM GRANULOCYTES NFR BLD: 0 % (ref 0–5)
LYMPHOCYTES NFR BLD: 1.98 K/UL (ref 1.5–4)
LYMPHOCYTES RELATIVE PERCENT: 28 % (ref 20–42)
MAGNESIUM SERPL-MCNC: 2 MG/DL (ref 1.6–2.6)
MCH RBC QN AUTO: 30.9 PG (ref 26–35)
MCHC RBC AUTO-ENTMCNC: 32.9 G/DL (ref 32–34.5)
MCV RBC AUTO: 94 FL (ref 80–99.9)
MONOCYTES NFR BLD: 0.86 K/UL (ref 0.1–0.95)
MONOCYTES NFR BLD: 12 % (ref 2–12)
NEUTROPHILS NFR BLD: 56 % (ref 43–80)
NEUTS SEG NFR BLD: 3.93 K/UL (ref 1.8–7.3)
PHOSPHATE SERPL-MCNC: 3.5 MG/DL (ref 2.5–4.5)
PLATELET # BLD AUTO: 264 K/UL (ref 130–450)
PMV BLD AUTO: 10.3 FL (ref 7–12)
POTASSIUM SERPL-SCNC: 5.3 MMOL/L (ref 3.5–5)
RBC # BLD AUTO: 3.01 M/UL (ref 3.8–5.8)
SODIUM SERPL-SCNC: 140 MMOL/L (ref 132–146)
WBC OTHER # BLD: 7.1 K/UL (ref 4.5–11.5)

## 2025-04-04 PROCEDURE — 82962 GLUCOSE BLOOD TEST: CPT

## 2025-04-04 PROCEDURE — 36415 COLL VENOUS BLD VENIPUNCTURE: CPT

## 2025-04-04 PROCEDURE — 85025 COMPLETE CBC W/AUTO DIFF WBC: CPT

## 2025-04-04 PROCEDURE — 6360000002 HC RX W HCPCS: Performed by: INTERNAL MEDICINE

## 2025-04-04 PROCEDURE — 6370000000 HC RX 637 (ALT 250 FOR IP): Performed by: INTERNAL MEDICINE

## 2025-04-04 PROCEDURE — 2060000000 HC ICU INTERMEDIATE R&B

## 2025-04-04 PROCEDURE — 2580000003 HC RX 258: Performed by: INTERNAL MEDICINE

## 2025-04-04 PROCEDURE — 6360000002 HC RX W HCPCS: Performed by: NURSE PRACTITIONER

## 2025-04-04 PROCEDURE — 80048 BASIC METABOLIC PNL TOTAL CA: CPT

## 2025-04-04 PROCEDURE — 2500000003 HC RX 250 WO HCPCS: Performed by: NURSE PRACTITIONER

## 2025-04-04 PROCEDURE — 83735 ASSAY OF MAGNESIUM: CPT

## 2025-04-04 PROCEDURE — 84100 ASSAY OF PHOSPHORUS: CPT

## 2025-04-04 PROCEDURE — 6370000000 HC RX 637 (ALT 250 FOR IP): Performed by: NURSE PRACTITIONER

## 2025-04-04 RX ORDER — FERROUS SULFATE 325(65) MG
325 TABLET ORAL 2 TIMES DAILY WITH MEALS
Qty: 30 TABLET | Refills: 3 | Status: SHIPPED | OUTPATIENT
Start: 2025-04-04

## 2025-04-04 RX ORDER — POLYETHYLENE GLYCOL 3350 17 G/17G
17 POWDER, FOR SOLUTION ORAL DAILY PRN
Qty: 30 PACKET | Refills: 0 | Status: SHIPPED | OUTPATIENT
Start: 2025-04-04 | End: 2025-05-04

## 2025-04-04 RX ORDER — FERROUS SULFATE 325(65) MG
325 TABLET ORAL 2 TIMES DAILY WITH MEALS
Status: DISCONTINUED | OUTPATIENT
Start: 2025-04-04 | End: 2025-04-05 | Stop reason: HOSPADM

## 2025-04-04 RX ORDER — DRONABINOL 2.5 MG/1
2.5 CAPSULE ORAL 2 TIMES DAILY
Qty: 60 CAPSULE | Refills: 0 | Status: SHIPPED | OUTPATIENT
Start: 2025-04-04 | End: 2025-05-04

## 2025-04-04 RX ADMIN — DRONABINOL 2.5 MG: 2.5 CAPSULE ORAL at 07:48

## 2025-04-04 RX ADMIN — SODIUM CHLORIDE 125 MG: 9 INJECTION, SOLUTION INTRAVENOUS at 08:25

## 2025-04-04 RX ADMIN — HEPARIN SODIUM 5000 UNITS: 5000 INJECTION INTRAVENOUS; SUBCUTANEOUS at 05:18

## 2025-04-04 RX ADMIN — HEPARIN SODIUM 5000 UNITS: 5000 INJECTION INTRAVENOUS; SUBCUTANEOUS at 13:11

## 2025-04-04 RX ADMIN — SODIUM ZIRCONIUM CYCLOSILICATE 10 G: 10 POWDER, FOR SUSPENSION ORAL at 06:36

## 2025-04-04 RX ADMIN — SODIUM CHLORIDE, SODIUM LACTATE, POTASSIUM CHLORIDE, AND CALCIUM CHLORIDE: .6; .31; .03; .02 INJECTION, SOLUTION INTRAVENOUS at 05:36

## 2025-04-04 RX ADMIN — DRONABINOL 2.5 MG: 2.5 CAPSULE ORAL at 21:00

## 2025-04-04 RX ADMIN — SODIUM CHLORIDE, PRESERVATIVE FREE 10 ML: 5 INJECTION INTRAVENOUS at 21:00

## 2025-04-04 RX ADMIN — CHOLECALCIFEROL TAB 10 MCG (400 UNIT) 400 UNITS: 10 TAB at 07:48

## 2025-04-04 RX ADMIN — HEPARIN SODIUM 5000 UNITS: 5000 INJECTION INTRAVENOUS; SUBCUTANEOUS at 21:00

## 2025-04-04 RX ADMIN — HYDROXYZINE HYDROCHLORIDE 25 MG: 50 INJECTION, SOLUTION INTRAMUSCULAR at 21:35

## 2025-04-04 RX ADMIN — FERROUS SULFATE TAB 325 MG (65 MG ELEMENTAL FE) 325 MG: 325 (65 FE) TAB at 17:19

## 2025-04-04 RX ADMIN — PETROLATUM: 420 OINTMENT TOPICAL at 07:48

## 2025-04-04 ASSESSMENT — PAIN SCALES - GENERAL
PAINLEVEL_OUTOF10: 0

## 2025-04-04 NOTE — PROGRESS NOTES
Palliative Care Department  [phone]  Palliative Care Progress Note  Zelda Gamez MD    Paul Pemberton  [id number]  Hospital Day: 10  Location of Service: [facility]  Date of Initial Consult: 4/1/25  Referring Provider: Dr. Cohen  Palliative Medicine was consulted for assistance with: Code status Discussion, Assist with goals of care    ASSESSMENT & PLAN:     Alzheimer's dementia  Patient confused at baseline  Had declining appetite  According to family has never been a good eater  Has had worsening confusion  Recently had hospitalization for fall  PEG tube was cancelled due to PO improvement yesterday  Poor appetite again today    Symptoms related to dementia at this time we will continue to follow    Palliative Care Encounter  Dementia  Will continue to follow for ongoing monitoring of progression of Anxiety and Anorexia as well as for appropriateness for hospice care due to Dementia  Will continue to evaluate test results related to and response to treatment of Dementia and medication effectiveness for Anxiety and Anorexia,  Will obtain testing as needed to monitor medication results:N/A  Will continue to assess patient status including monitor for opiate induced constipation  Ongoing counseling of patient and family regarding diagnoses and prognosis of Dementia  Will continue treatment including none at this time    Goals of care: Live Longer and Improve or Maintain Function/Quality of Life  Surrogate: Child  Prognosis: depends upon goals  Spiritual assessment: No spiritual distress identified   Bereavement and grief: Low Risk Bereavement    Advance Care Planning Documents:    Healthcare Power of : Not completed  Financial Power of : Not completed  Living Will: Not completed  Code Status: Full code      Discharge planning: to be determined    Patient meets criteria for general inpatient hospice care including the following: N/A- Palliative Care Patient    Referrals to:

## 2025-04-04 NOTE — PROGRESS NOTES
Notified patient's son, Paul, of scheduled pick-up time of 1800 for patient to return to [place].

## 2025-04-04 NOTE — PROGRESS NOTES
This RN and PCA attempted to feed patient breakfast x2. Patient ate roughly 25% and had 180mL of orange juice

## 2025-04-04 NOTE — PROGRESS NOTES
Nephrology Progress Note      Reason for Consult: Hyponatremia and REGINALDO  Date of Service: 4/4/2025   Chief Complaint: had concerns including OTHER (ABNORMAL LABS- SODIUM 158).  History Obtained From: patient electronic medical record     History of Presenting illness:   From the 3/30/25 note of Dr. Merino: Paul Pemberton is a 89 y.o. male with a past medical history of diabetes hypertension and dementia.     They presented on 3/26/2025 complaining of abnormal labs.  He presented with decreased oral intake for several days.  He is a poor historian and provides conflicting information.  He was subsequently admitted for a potassium of 5.9, sodium 160 and a creatinine of 2.3.  Nephrology was consulted for hyperkalemia, hyponatremia and REGINALDO.    Interval history  4/4/25: Pt sleeping at the time of my visit. Arousable, did not interact with me    Past Medical History:   Past Medical History:   Diagnosis Date    Allergic rhinitis     Diabetic neuropathy (HCC)     Hyperlipidemia     Hypertension     Osteoarthritis     Type 2 diabetes mellitus without complication (HCC)        Past Surgical History:   Past Surgical History:   Procedure Laterality Date    URETHRAL STRICTURE DILATATION         Medications Prior to Admission:   Medications Prior to Admission: vitamin D (ERGOCALCIFEROL) 1.25 MG (26034 UT) CAPS capsule, Take 1 capsule by mouth once a week Every Tuesday give for low vitamin D  Cholecalciferol (VITAMIN D) 10 MCG (400 UNIT) CAPS Capsule, Take 1 capsule by mouth daily  Multiple Vitamin (MULTIVITAMIN) TABS tablet, Take 1 tablet by mouth daily (Patient not taking: Reported on 3/27/2025)  bismuth subsalicylate (PEPTO BISMOL) 262 MG/15ML suspension, Take 30 mLs by mouth every 6 hours as needed for Indigestion or Other (Patient not taking: Reported on 3/27/2025)    Allergies:   Patient has no known allergies.    Social History:    reports that he has never smoked. He has never used smokeless tobacco. He reports that he does

## 2025-04-04 NOTE — DISCHARGE INSTR - DIET

## 2025-04-04 NOTE — CARE COORDINATION
CASE MANAGEMENT....Per nursing, patient may discharge today pending PCP Input. Ok per renal. Mr Pemberton will return to [place]. Placed on \"will call\" with [facility] ([phone]). [address] in [facility]. Ambulance forms in chart. No need for [address]. Will follow.

## 2025-04-04 NOTE — PLAN OF CARE
Problem: Chronic Conditions and Co-morbidities  Goal: Patient's chronic conditions and co-morbidity symptoms are monitored and maintained or improved  4/4/2025 0408 by Meño Ramachandran RN  Outcome: Progressing     Problem: Discharge Planning  Goal: Discharge to home or other facility with appropriate resources  4/4/2025 0408 by Meño Ramachandran RN  Outcome: Progressing     Problem: Safety - Adult  Goal: Free from fall injury  4/4/2025 0408 by Meño Ramachandran RN  Outcome: Progressing     Problem: Skin/Tissue Integrity  Goal: Skin integrity remains intact  Description: 1.  Monitor for areas of redness and/or skin breakdown  2.  Assess vascular access sites hourly  3.  Every 4-6 hours minimum:  Change oxygen saturation probe site  4.  Every 4-6 hours:  If on nasal continuous positive airway pressure, respiratory therapy assess nares and determine need for appliance change or resting period  4/4/2025 0408 by Meño Ramachandran RN  Outcome: Progressing     Problem: ABCDS Injury Assessment  Goal: Absence of physical injury  4/4/2025 0408 by Meño Ramachandran RN  Outcome: Progressing     Problem: Pain  Goal: Verbalizes/displays adequate comfort level or baseline comfort level  4/4/2025 0408 by Meño Ramachandran RN  Outcome: Progressing     Problem: Nutrition Deficit:  Goal: Optimize nutritional status  4/4/2025 0408 by Meño Ramachandran RN  Outcome: Progressing

## 2025-04-04 NOTE — DISCHARGE SUMMARY
[facility]   Discharge summary   Patient ID:  Paul Pemberton  [id number]  89 y.o.  9/5/1929    Admit date: 3/26/2025    Discharge date and time: 4/4/2025    Admission Diagnoses:   Patient Active Problem List   Diagnosis    Type II diabetes mellitus (HCC)    Urethral stricture    Stage 3b chronic kidney disease (HCC)    Intrinsic eczema    Severe protein-calorie malnutrition    Alzheimer's disease with late onset (HCC)    REGINALDO (acute kidney injury)    FTT (failure to thrive) in adult    Unable to care for self    Falls    Hyperkalemia    Hypernatremia       Discharge Diagnoses:   Patient Active Problem List   Diagnosis    Type II diabetes mellitus (HCC)    Urethral stricture    Stage 3b chronic kidney disease (HCC)    Intrinsic eczema    Severe protein-calorie malnutrition    Alzheimer's disease with late onset (HCC)    RGEINALDO (acute kidney injury)    FTT (failure to thrive) in adult    Unable to care for self    Falls    Hyperkalemia    Hypernatremia       Consults: Nephrology, General Surgery    Procedures: None    Hospital Course: The patient is a 89 y.o. male of Guillaume Krishna DO with significant past medical history of dementia, DM2, HTN who presents with elevated potassium and sodium.     Sent from NH due to abnormal labs.  Sodium 158  Decreased oral intake for few days. Patient is poor historian.  Potassium 5.8 - given ca gluconate, insulin, and bicarb.  Repeat 4.7.     Started on IV fluids.     No complaints. No appetite. Refusing to eat.     All labs personally reviewed   All imaging personally reviewed.    Assessment:     Principal Problem:    REGINALDO (acute kidney injury)  Active Problems:    Type II diabetes mellitus (HCC)    Severe protein-calorie malnutrition    FTT (failure to thrive) in adult    Hypernatremia  Resolved Problems:    * No resolved hospital problems. *        Plan:     REGINALDO - secondary to dehydration.  FENa 0.9 consistent with pre-renal.  Not eating/drinking, no interest.  Nephro  [phone]   droNABinol 2.5 MG capsule  ferrous sulfate 325 (65 Fe) MG tablet  polyethylene glycol 17 g packet       Activity: activity as tolerated  Diet: Easy to chew, low potassium, low sodium, low fat    Pt has been advised to:    Follow-up with Guillaume Krishna DO in 1 week.  Follow-up with consultants as recommended by them      Signed:  JOSE Day CNP  4/4/2025  1:38 PM

## 2025-04-05 VITALS
HEART RATE: 75 BPM | BODY MASS INDEX: 23.53 KG/M2 | WEIGHT: 127.87 LBS | OXYGEN SATURATION: 98 % | HEIGHT: 62 IN | DIASTOLIC BLOOD PRESSURE: 60 MMHG | TEMPERATURE: 98 F | RESPIRATION RATE: 20 BRPM | SYSTOLIC BLOOD PRESSURE: 112 MMHG

## 2025-04-05 LAB — GLUCOSE BLD-MCNC: 86 MG/DL (ref 74–99)

## 2025-04-05 PROCEDURE — 2500000003 HC RX 250 WO HCPCS: Performed by: NURSE PRACTITIONER

## 2025-04-05 PROCEDURE — 6370000000 HC RX 637 (ALT 250 FOR IP): Performed by: INTERNAL MEDICINE

## 2025-04-05 PROCEDURE — 82962 GLUCOSE BLOOD TEST: CPT

## 2025-04-05 PROCEDURE — 6370000000 HC RX 637 (ALT 250 FOR IP): Performed by: NURSE PRACTITIONER

## 2025-04-05 RX ADMIN — FERROUS SULFATE TAB 325 MG (65 MG ELEMENTAL FE) 325 MG: 325 (65 FE) TAB at 08:39

## 2025-04-05 RX ADMIN — DRONABINOL 2.5 MG: 2.5 CAPSULE ORAL at 08:39

## 2025-04-05 RX ADMIN — CHOLECALCIFEROL TAB 10 MCG (400 UNIT) 400 UNITS: 10 TAB at 08:39

## 2025-04-05 RX ADMIN — PETROLATUM: 420 OINTMENT TOPICAL at 08:39

## 2025-04-05 RX ADMIN — SODIUM CHLORIDE, PRESERVATIVE FREE 10 ML: 5 INJECTION INTRAVENOUS at 08:39
